# Patient Record
Sex: FEMALE | Race: WHITE | Employment: PART TIME | URBAN - METROPOLITAN AREA
[De-identification: names, ages, dates, MRNs, and addresses within clinical notes are randomized per-mention and may not be internally consistent; named-entity substitution may affect disease eponyms.]

---

## 2019-10-02 ENCOUNTER — HOSPITAL ENCOUNTER (OUTPATIENT)
Dept: PREADMISSION TESTING | Age: 59
Discharge: HOME | End: 2019-10-02
Payer: MEDICARE

## 2019-10-02 VITALS — WEIGHT: 170 LBS | BODY MASS INDEX: 38.24 KG/M2 | HEIGHT: 56 IN

## 2019-10-02 LAB
ANION GAP SERPL CALC-SCNC: 6 MMOL/L (ref 3–18)
BUN SERPL-MCNC: 13 MG/DL (ref 7–18)
BUN/CREAT SERPL: 23 (ref 12–20)
CALCIUM SERPL-MCNC: 9.3 MG/DL (ref 8.5–10.1)
CHLORIDE SERPL-SCNC: 105 MMOL/L (ref 100–111)
CO2 SERPL-SCNC: 30 MMOL/L (ref 21–32)
CREAT SERPL-MCNC: 0.56 MG/DL (ref 0.6–1.3)
GLUCOSE SERPL-MCNC: 81 MG/DL (ref 74–99)
HCT VFR BLD AUTO: 36.8 % (ref 35–45)
HGB BLD-MCNC: 12 G/DL (ref 12–16)
POTASSIUM SERPL-SCNC: 3.8 MMOL/L (ref 3.5–5.5)
SODIUM SERPL-SCNC: 141 MMOL/L (ref 136–145)

## 2019-10-02 PROCEDURE — 80048 BASIC METABOLIC PNL TOTAL CA: CPT

## 2019-10-02 PROCEDURE — 36415 COLL VENOUS BLD VENIPUNCTURE: CPT

## 2019-10-02 PROCEDURE — 85018 HEMOGLOBIN: CPT

## 2019-10-02 PROCEDURE — 93005 ELECTROCARDIOGRAM TRACING: CPT

## 2019-10-02 RX ORDER — SODIUM CHLORIDE, SODIUM LACTATE, POTASSIUM CHLORIDE, CALCIUM CHLORIDE 600; 310; 30; 20 MG/100ML; MG/100ML; MG/100ML; MG/100ML
125 INJECTION, SOLUTION INTRAVENOUS CONTINUOUS
Status: CANCELLED | OUTPATIENT
Start: 2019-10-02

## 2019-10-02 RX ORDER — CEFAZOLIN SODIUM/WATER 2 G/20 ML
2 SYRINGE (ML) INTRAVENOUS ONCE
Status: CANCELLED | OUTPATIENT
Start: 2019-10-02 | End: 2019-10-02

## 2019-10-02 RX ORDER — MELATONIN 10 MG
1 CAPSULE ORAL
COMMUNITY

## 2019-10-02 RX ORDER — CITALOPRAM 40 MG/1
40 TABLET, FILM COATED ORAL EVERY EVENING
COMMUNITY

## 2019-10-02 RX ORDER — ASCORBIC ACID 500 MG
1000 TABLET ORAL DAILY
COMMUNITY

## 2019-10-02 NOTE — PERIOP NOTES
Patient does meet criteria for special pop. No hx of sleep apnea or previous screening. Denies hx of MH. PCP is aware of surgery. CHG skin care kit given and process reviewed. Not participating in any research study or clinical trials. Patient has Cerebral Palsy and is confined to a wheelchair.

## 2019-10-03 LAB
ATRIAL RATE: 63 BPM
CALCULATED P AXIS, ECG09: 34 DEGREES
CALCULATED R AXIS, ECG10: 10 DEGREES
CALCULATED T AXIS, ECG11: 37 DEGREES
DIAGNOSIS, 93000: NORMAL
P-R INTERVAL, ECG05: 154 MS
Q-T INTERVAL, ECG07: 416 MS
QRS DURATION, ECG06: 84 MS
QTC CALCULATION (BEZET), ECG08: 425 MS
VENTRICULAR RATE, ECG03: 63 BPM

## 2019-10-08 ENCOUNTER — ANESTHESIA EVENT (OUTPATIENT)
Dept: SURGERY | Age: 59
End: 2019-10-08
Payer: MEDICARE

## 2019-10-08 ENCOUNTER — ANESTHESIA (OUTPATIENT)
Dept: SURGERY | Age: 59
End: 2019-10-08
Payer: MEDICARE

## 2019-10-08 ENCOUNTER — HOSPITAL ENCOUNTER (OUTPATIENT)
Age: 59
Setting detail: OUTPATIENT SURGERY
Discharge: HOME | End: 2019-10-08
Attending: UROLOGY | Admitting: UROLOGY
Payer: MEDICARE

## 2019-10-08 ENCOUNTER — APPOINTMENT (OUTPATIENT)
Dept: GENERAL RADIOLOGY | Age: 59
End: 2019-10-08
Attending: UROLOGY
Payer: MEDICARE

## 2019-10-08 VITALS
RESPIRATION RATE: 20 BRPM | DIASTOLIC BLOOD PRESSURE: 71 MMHG | TEMPERATURE: 97.8 F | BODY MASS INDEX: 38.24 KG/M2 | OXYGEN SATURATION: 100 % | WEIGHT: 170 LBS | HEIGHT: 56 IN | HEART RATE: 65 BPM | SYSTOLIC BLOOD PRESSURE: 125 MMHG

## 2019-10-08 PROCEDURE — C1769 GUIDE WIRE: HCPCS | Performed by: UROLOGY

## 2019-10-08 PROCEDURE — 74011000250 HC RX REV CODE- 250

## 2019-10-08 PROCEDURE — 76210000021 HC REC RM PH II 0.5 TO 1 HR: Performed by: UROLOGY

## 2019-10-08 PROCEDURE — 77030020782 HC GWN BAIR PAWS FLX 3M -B: Performed by: UROLOGY

## 2019-10-08 PROCEDURE — 74011250636 HC RX REV CODE- 250/636

## 2019-10-08 PROCEDURE — 74011250636 HC RX REV CODE- 250/636: Performed by: UROLOGY

## 2019-10-08 PROCEDURE — 76010000154 HC OR TIME FIRST 0.5 HR: Performed by: UROLOGY

## 2019-10-08 PROCEDURE — 76060000031 HC ANESTHESIA FIRST 0.5 HR: Performed by: UROLOGY

## 2019-10-08 RX ORDER — OXYCODONE AND ACETAMINOPHEN 5; 325 MG/1; MG/1
1 TABLET ORAL AS NEEDED
Status: CANCELLED | OUTPATIENT
Start: 2019-10-08

## 2019-10-08 RX ORDER — PROPOFOL 10 MG/ML
INJECTION, EMULSION INTRAVENOUS AS NEEDED
Status: DISCONTINUED | OUTPATIENT
Start: 2019-10-08 | End: 2019-10-08 | Stop reason: HOSPADM

## 2019-10-08 RX ORDER — SODIUM CHLORIDE, SODIUM LACTATE, POTASSIUM CHLORIDE, CALCIUM CHLORIDE 600; 310; 30; 20 MG/100ML; MG/100ML; MG/100ML; MG/100ML
50 INJECTION, SOLUTION INTRAVENOUS CONTINUOUS
Status: CANCELLED | OUTPATIENT
Start: 2019-10-08

## 2019-10-08 RX ORDER — KETAMINE HYDROCHLORIDE 10 MG/ML
INJECTION, SOLUTION INTRAMUSCULAR; INTRAVENOUS AS NEEDED
Status: DISCONTINUED | OUTPATIENT
Start: 2019-10-08 | End: 2019-10-08 | Stop reason: HOSPADM

## 2019-10-08 RX ORDER — MIDAZOLAM HYDROCHLORIDE 1 MG/ML
INJECTION, SOLUTION INTRAMUSCULAR; INTRAVENOUS AS NEEDED
Status: DISCONTINUED | OUTPATIENT
Start: 2019-10-08 | End: 2019-10-08 | Stop reason: HOSPADM

## 2019-10-08 RX ORDER — FENTANYL CITRATE 50 UG/ML
25 INJECTION, SOLUTION INTRAMUSCULAR; INTRAVENOUS
Status: CANCELLED | OUTPATIENT
Start: 2019-10-08

## 2019-10-08 RX ORDER — CEPHALEXIN 500 MG/1
500 CAPSULE ORAL 3 TIMES DAILY
Qty: 9 CAP | Refills: 0 | Status: SHIPPED | OUTPATIENT
Start: 2019-10-08 | End: 2019-10-11

## 2019-10-08 RX ORDER — NALOXONE HYDROCHLORIDE 0.4 MG/ML
0.1 INJECTION, SOLUTION INTRAMUSCULAR; INTRAVENOUS; SUBCUTANEOUS
Status: CANCELLED | OUTPATIENT
Start: 2019-10-08

## 2019-10-08 RX ORDER — LIDOCAINE HYDROCHLORIDE 20 MG/ML
INJECTION, SOLUTION EPIDURAL; INFILTRATION; INTRACAUDAL; PERINEURAL AS NEEDED
Status: DISCONTINUED | OUTPATIENT
Start: 2019-10-08 | End: 2019-10-08 | Stop reason: HOSPADM

## 2019-10-08 RX ORDER — HYDROMORPHONE HYDROCHLORIDE 2 MG/ML
0.5 INJECTION, SOLUTION INTRAMUSCULAR; INTRAVENOUS; SUBCUTANEOUS
Status: CANCELLED | OUTPATIENT
Start: 2019-10-08

## 2019-10-08 RX ORDER — ONDANSETRON 2 MG/ML
4 INJECTION INTRAMUSCULAR; INTRAVENOUS ONCE
Status: CANCELLED | OUTPATIENT
Start: 2019-10-08 | End: 2019-10-08

## 2019-10-08 RX ORDER — CEFAZOLIN SODIUM/WATER 2 G/20 ML
2 SYRINGE (ML) INTRAVENOUS ONCE
Status: COMPLETED | OUTPATIENT
Start: 2019-10-08 | End: 2019-10-08

## 2019-10-08 RX ORDER — MAGNESIUM SULFATE 100 %
4 CRYSTALS MISCELLANEOUS AS NEEDED
Status: CANCELLED | OUTPATIENT
Start: 2019-10-08

## 2019-10-08 RX ORDER — INSULIN LISPRO 100 [IU]/ML
INJECTION, SOLUTION INTRAVENOUS; SUBCUTANEOUS ONCE
Status: CANCELLED | OUTPATIENT
Start: 2019-10-08 | End: 2019-10-09

## 2019-10-08 RX ORDER — SODIUM CHLORIDE, SODIUM LACTATE, POTASSIUM CHLORIDE, CALCIUM CHLORIDE 600; 310; 30; 20 MG/100ML; MG/100ML; MG/100ML; MG/100ML
125 INJECTION, SOLUTION INTRAVENOUS CONTINUOUS
Status: DISCONTINUED | OUTPATIENT
Start: 2019-10-08 | End: 2019-10-08 | Stop reason: HOSPADM

## 2019-10-08 RX ADMIN — MIDAZOLAM HYDROCHLORIDE 2 MG: 1 INJECTION, SOLUTION INTRAMUSCULAR; INTRAVENOUS at 13:35

## 2019-10-08 RX ADMIN — PROPOFOL 30 MG: 10 INJECTION, EMULSION INTRAVENOUS at 13:57

## 2019-10-08 RX ADMIN — SODIUM CHLORIDE, SODIUM LACTATE, POTASSIUM CHLORIDE, AND CALCIUM CHLORIDE 125 ML/HR: 600; 310; 30; 20 INJECTION, SOLUTION INTRAVENOUS at 12:30

## 2019-10-08 RX ADMIN — Medication 2 G: at 13:35

## 2019-10-08 RX ADMIN — PROPOFOL 60 MG: 10 INJECTION, EMULSION INTRAVENOUS at 13:52

## 2019-10-08 RX ADMIN — KETAMINE HYDROCHLORIDE 10 MG: 10 INJECTION, SOLUTION INTRAMUSCULAR; INTRAVENOUS at 13:49

## 2019-10-08 RX ADMIN — KETAMINE HYDROCHLORIDE 20 MG: 10 INJECTION, SOLUTION INTRAMUSCULAR; INTRAVENOUS at 13:44

## 2019-10-08 RX ADMIN — LIDOCAINE HYDROCHLORIDE 60 MG: 20 INJECTION, SOLUTION EPIDURAL; INFILTRATION; INTRACAUDAL; PERINEURAL at 13:52

## 2019-10-08 NOTE — H&P
Urology [facility]  [address] [place]  [address]  Tel: [phone]  Fax: [phone]        Patient: Carlen Bumpers  YOB: 1960          Completed Orders (this encounter)  Order Interpretation Result Next Lab Date   URINALYSIS, AUTO, W/O SCOPE see detail Test 1Color: yellow. Clarity: clear. Glucose: negative. Bilirubin: negative. Ketones: negative. Specific Gravity: 1.015. Blood: negative. pH: 7.0. Protein: negative. Urobilinogen: 0.2 mg/dl  Nitrite: negative. Leukocytes: negative. Assessment/Plan  # Detail Type Description    1. Assessment Other urethral stricture, female (N35.82). Patient Plan She has worsening sx's and difficulties voiding. She has a worse PVR. We will do a cysto and urethral dilation. We discussed that likely we will not need to leave a cath as she has not needed them before but there's the possibility we would. Risk of bleeding and infection were also discussed. She will proceed. 2. Assessment Feeling of incomplete bladder emptying (R39.14). Plan Orders The patient had the following test(s) completed today: URINALYSIS, AUTO, W/O SCOPE. This 64year old female presents for Urinary Retention. History of Present Illness:  1. Urinary Retention   Onset was 23 years ago. Severity level is no pain. It occurs daily. The problem is worse. Associated symptoms include incomplete emptying, nocturia, pelvic pressure, slow stream, urgency, dribbling and urinary frequency. Pertinent negatives include chills, constipation and fever. Additional information: BM's QOD -- that are huge and hard like a cucumber. she leaks with what is probably stress manuevers  -- She has been dilated every 2-4 years in the operating room in the past when her symptoms get worse and then they get better post-op. She has a h/o CP. She is s/p post cysto/dilation 9/2014.   she feels much better    Pretreatment PVR = 245 ml and 0 ml psot-op and 44ml [id number]. .       9/2019 --She is doing worse in the past few months and really straining to urinate. Her flow is poor and she strains and pulls /strains abd muscles while trying to void. No UTI. No hematuria. She is struggling. PVR = 375ml. PAST MEDICAL/SURGICAL HISTORY   (Detailed)    Disease/disorder Onset Date Management Date Comments   Hardware removal right ankle/foot 2013      Right ankle surgery 2013        cysto, urethral dilation 09/30/2014      cysto, urehtral dilation 03/14/2012      Baclofen Pump 2012      Cholecystectomy 1993    congenital hip dysplasia  Left Hip Surgery 1976      Scissor Brielle 1966      Heal Cords 1966      OBSTETRIC HISTORY:  Not currently pregnant. PROBLEM LIST:   Problem List reviewed.    Problem Description Onset Date Chronic Clinical Status Notes   Infantile cerebral palsy 03/05/2012 Y     Benign essential hypertension 08/12/2013 Y     Gastroesophageal reflux disease 08/12/2013 Y     Depressive disorder 08/12/2013 Y     Acquired deformity of ankle AND/OR foot 08/17/2013 Y     Generalized anxiety disorder 10/25/2013 Y           Medications (active prior to today)  Medication Instructions Start Date Stop Date Refilled Elsewhere   Vitamin D3 1,000 unit tablet Twice a day 08/06/2013   N   Vitamin B-12 500 mcg tablet 1 daily 08/06/2013   N   baclofen 10 mg tablet  //   Y   Vitamin C 1,000 mg tablet  //   Y   Celexa 40 mg tablet take 1 tablet by oral route  every day 12/12/2018 12/12/2018 N   PANTOPRAZOLE TAB 40MG DR TAKE 1 TABLET DAILY 01/10/2019  01/10/2019 N   ramipril 5 mg capsule take 1 capsule by oral route 2 times every day 03/13/2019   N   RAMIPRIL 10 MG CAPSULE TAKE ONE CAPSULE BY MOUTH EVERY DAY 07/15/2019  07/15/2019 N   amitriptyline 10 mg tablet take 1-2  tablet (10-20 MG)  by oral route  every day 08/15/2019  08/15/2019 N   Lasix 20 mg tablet TAKE 1 TABLET BY MOUTH ONE TO 2 TIMES EVERY DAY FOR FLUID RETENTION 09/11/2019 09/11/2019 N Medication Reconciliation  Medications reconciled today. Allergies  Ingredient Reaction (Severity) Medication Name Comment   CODEINE Sleep Walk     TERBINAFINE HCL diarrhea  Lamisil     Reviewed, no changes. Family History  (Detailed)  Relationship Family Member Name  Age at Death Condition Onset Age Cause of Death       Family history of GERD  N       Family history of Asthma  N   Brother  N  Alive and well  N   Father  N  Alive and well  N   Mother  N  Depression  N         Social History:  (Detailed)  Tobacco use reviewed. Preferred language is [place]. EDUCATION/EMPLOYMENT/OCCUPATION  The patient has a(n) high school education. Patient has special needs: CP.   MARITAL STATUS/FAMILY/SOCIAL SUPPORT  Currently single. CHILDREN  Does not have children. [address] [place] [address] status is stable/permanent. The patient lives with mother and father. Her mother is her best frined. She reports she has a lot of friends but their cars can't accomodate her wheelchair  Tobacco use status: Never smoked tobacco.  Smoking status: Never smoker. SMOKING STATUS  Use Status Type Smoking Status Usage Per Day Years Used Total Pack Years   no/never  Never smoker        ALCOHOL  There is no history of alcohol use. CAFFEINE  The patient uses caffeine: tea.  EXPERIENCE  Patient has no  experience. Review of Systems  System Neg/Pos Details   Constitutional Negative Chills and Fever. ENMT Negative Ear infections and Sore throat. Eyes Negative Blurred vision, Double vision and Eye pain. Respiratory Negative Asthma, Chronic cough, Dyspnea and Wheezing. Cardio Negative Chest pain. GI Negative Constipation, Decreased appetite, Diarrhea, Nausea and Vomiting.  Positive Incomplete emptying, Nocturia, Pelvic pressure, Slow stream, Urgency, Urinary dribbling, Urinary frequency.    Endocrine Negative Cold intolerance, Heat intolerance, Increased thirst and Weight loss.   Neuro Negative Headache and Tremors. Psych Negative Anxiety and Depression. Integumentary Negative Itching skin and Rash. MS Negative Back pain and Joint pain. Hema/Lymph Negative Easy bleeding. Vital Signs     Height  Time ft in cm Last Measured Height Position   3:05 PM 4.0 8.00 142.24 09/16/2019 Standing     Weight/BSA/BMI  Time lb oz kg Context BMI kg/m2 BSA m2   3:05 .00  77.111 dressed with shoes 38.11      Blood Pressure  Time BP mm/Hg Position Side Site Method Cuff Size   3:05 /69 sitting right  automatic adult     Temperature/Pulse/Respiration  Time Temp F Temp C Temp Site Pulse/min Pattern Resp/ min   3:05 PM 98.10 36.72  86       Measured By  Time Measured by   3:05 PM Brady Phelps       Physical Exam  Exam Findings Details   Constitutional * Overall appearance - wheelchair bound. Neck Exam Normal Inspection - Normal.   Respiratory Normal Inspection - Normal.   Abdomen Normal No abdominal tenderness. Genitourinary Normal No Suprapubic tenderness. No CVA tenderness. Extremity Normal No Edema. Psychiatric Normal Orientation - Oriented to time, place, person & situation. Appropriate mood and affect. Procedures:    Uroflow:  Feeling of incomplete bladder emptying R39.14. Procedure:   Sonographic residual urine: 375mL. Time after void: 5 Minutes. Comments:. Physician: Yareli Cherry MD. Date: 09/16/2019. Time: 3:14 PM.  Post procedure: Instructions:. Patient Education  # Patient Education   1.  Urinary Retention: Care Instructions     Medications (added, continued, or stopped today)  Start Date Medication Directions PRN Status PRN Reason Instruction Stop Date   08/15/2019 amitriptyline 10 mg tablet take 1-2  tablet (10-20 MG)  by oral route  every day N       baclofen 10 mg tablet  N      12/12/2018 Celexa 40 mg tablet take 1 tablet by oral route  every day N      09/11/2019 Lasix 20 mg tablet TAKE 1 TABLET BY MOUTH ONE TO 2 TIMES EVERY DAY FOR FLUID RETENTION N 01/10/2019 PANTOPRAZOLE TAB 40MG DR TAKE 1 TABLET DAILY N      07/15/2019 RAMIPRIL 10 MG CAPSULE TAKE ONE CAPSULE BY MOUTH EVERY DAY N      03/13/2019 ramipril 5 mg capsule take 1 capsule by oral route 2 times every day N      08/06/2013 Vitamin B-12 500 mcg tablet 1 daily N       Vitamin C 1,000 mg tablet  N      08/06/2013 Vitamin D3 1,000 unit tablet Twice a day N      Active Patient Care Team Members    Name Contact Agency Type Support Role Relationship Active Date Inactive Date Specialty   Wagner Lim   specialist    Psychiatry   [facility]   specialist    Ortho   [employer]   Patient provider PCP   Family Practice   [employer]   primary care provider       Yossi Garnica   encounter provider    Urology   Lynnea Felty   encounter provider    Physical Therapy   Denisse Vega   primary practice provider       Richard Rico   encounter provider      Odilon Alston   specialist    Psychiatry   Halina Po    Parent, Child is the Patient      Halina Po   Emergency Contact Father          Provider:       Jenni Solano MD

## 2019-10-08 NOTE — BRIEF OP NOTE
BRIEF OPERATIVE NOTE    Date of Procedure: 10/8/2019   Preoperative Diagnosis: FEELING INCOMPLETE BLADDER EMPTYING, FEMALE URETHRAL STRICTURE  Postoperative Diagnosis: FEELING INCOMPLETE BLADDER EMPTYING,  FEMALE URETHRAL STRICTURE    Procedure(s):  CYSTOSCOPY, URETHRAL DILATATION W/C-ARM, \"SPEC POP\"  Surgeon(s) and Role:     * Cindy Pablo MD - Primary         Surgical Assistant: NONE    Surgical Staff:  Circ-1: Lang Wynne  Circ-2: Spring Luna RN  Radiology Technician: Светлана Montemayor  Scrub Tech-1: Caryl Aceves  Scrub Tech-2: Missy Bazan  Event Time In Time Out   Incision Start 1356    Incision Close 1400      Anesthesia: MAC   Estimated Blood Loss: NONE  Specimens: * No specimens in log *   Findings: MODERATELY TIGHT URETHRA NONE   Complications: NONE  Implants: * No implants in log *

## 2019-10-08 NOTE — INTERVAL H&P NOTE
H&P Update: 
Linnette Fitch was seen and examined. History and physical has been reviewed. The patient has been examined.  There have been no significant clinical changes since the completion of the originally dated History and Physical.

## 2019-10-08 NOTE — PERIOP NOTES
Reviewed PTA medication list with patient/caregiver and patient/caregiver denies any additional medications. Patient admits to having a responsible adult care for them at home for at least 24 hours after surgery. Patient encouraged to use gown warming system and informed that using said warming gown to regulate body temperature prior to a procedure has been shown to help reduce the risks of blood clots and infection. Dual skin assessment & fall risk band verification completed with Zeina Burton RN.

## 2019-10-08 NOTE — PERIOP NOTES
Discharge instructions completed - opportunity for questions - AVS med list reviewed for duplicates - states \" still urinating on bedpan\" -

## 2019-10-08 NOTE — ANESTHESIA POSTPROCEDURE EVALUATION
Post-Anesthesia Evaluation and Assessment    Cardiovascular Function/Vital Signs  Visit Vitals  /71   Pulse 65   Temp 36.6 °C (97.8 °F)   Resp 20   Ht 4' 8\" (1.422 m)   Wt 77.1 kg (170 lb)   SpO2 100%   BMI 38.11 kg/m²       Patient is status post Procedure(s):  CYSTOSCOPY, URETHRAL DILATATION W/C-ARM, \"SPEC POP\". Nausea/Vomiting: Controlled. Postoperative hydration reviewed and adequate. Pain:  Pain Scale 1: Numeric (0 - 10) (10/08/19 1510)  Pain Intensity 1: 0 (10/08/19 1510)   Managed. Neurological Status:   Neuro (WDL): Exceptions to WDL(cerebral palsy) (10/08/19 1510)   At baseline. Mental Status and Level of Consciousness: Baseline and stable. Pulmonary Status:   O2 Device: Room air (10/08/19 1510)   Adequate oxygenation and airway patent. Complications related to anesthesia: None    Post-anesthesia assessment completed. No concerns. Patient has met all discharge requirements.     Signed By: Lonny Mukherjee MD

## 2019-10-08 NOTE — ANESTHESIA PREPROCEDURE EVALUATION
Relevant Problems   No relevant active problems       Anesthetic History     PONV          Review of Systems / Medical History  Patient summary reviewed, nursing notes reviewed and pertinent labs reviewed    Pulmonary  Within defined limits                 Neuro/Psych         Psychiatric history     Cardiovascular    Hypertension                   GI/Hepatic/Renal     GERD: well controlled           Endo/Other        Morbid obesity and arthritis     Other Findings              Physical Exam    Airway  Mallampati: II  TM Distance: 4 - 6 cm  Neck ROM: normal range of motion   Mouth opening: Normal     Cardiovascular  Regular rate and rhythm,  S1 and S2 normal,  no murmur, click, rub, or gallop             Dental  No notable dental hx       Pulmonary  Breath sounds clear to auscultation               Abdominal  GI exam deferred       Other Findings            Anesthetic Plan    ASA: 3  Anesthesia type: MAC          Induction: Intravenous  Anesthetic plan and risks discussed with: Patient

## 2019-10-08 NOTE — DISCHARGE INSTRUCTIONS
Drink plenty of fluids to keep the urine clear  Resume pre hospital diet  Activity as tolerated  Take prescription as directed  Dr. Jessica Garcia office will call with follow up appointment      Patient Education        Cystoscopy: What to Expect at [facility]    A cystoscopy is a procedure that lets a doctor look inside of the bladder and the urethra. The urethra is the tube that carries urine from the bladder to outside the body. The doctor uses a thin, lighted tool called a cystoscope. Your bladder is filled with fluid. This stretches the bladder so that your doctor can look closely at the inside of your bladder. After the cystoscopy, your urethra may be sore at first, and it may burn when you urinate for the first few days after the procedure. You may feel the need to urinate more often, and your urine may be pink. These symptoms should get better in 1 or 2 days. You will probably be able to go back to most of your usual activities in 1 or 2 days. This care sheet gives you a general idea about how long it will take for you to recover. But each person recovers at a different pace. Follow the steps below to get better as quickly as possible. How can you care for yourself at home? Activity    · Rest when you feel tired. Getting enough sleep will help you recover.     · Try to walk each day. Start by walking a little more than you did the day before. Bit by bit, increase the amount you walk. Walking boosts blood flow and helps prevent pneumonia and constipation.     · Avoid strenuous activities, such as bicycle riding, jogging, weight lifting, or aerobic exercise, until your doctor says it is okay.     · Ask your doctor when you can drive again.     · Most people are able to return to work within 1 or 2 days after the procedure.     · You may shower and take baths as usual.     · Ask your doctor when it is okay for you to have sex. Diet    · You can eat your normal diet.  If your stomach is upset, try bland, low-fat foods like plain rice, broiled chicken, toast, and yogurt.     · Drink plenty of fluids (unless your doctor tells you not to). Medicines    · Take pain medicines exactly as directed. ? If the doctor gave you a prescription medicine for pain, take it as prescribed. ? If you are not taking a prescription pain medicine, ask your doctor if you can take an over-the-counter medicine.     · If you think your pain medicine is making you sick to your stomach:  ? Take your medicine after meals (unless your doctor has told you not to). ? Ask your doctor for a different pain medicine.     · If your doctor prescribed antibiotics, take them as directed. Do not stop taking them just because you feel better. You need to take the full course of antibiotics. Follow-up care is a key part of your treatment and safety. Be sure to make and go to all appointments, and call your doctor if you are having problems. It's also a good idea to know your test results and keep a list of the medicines you take. When should you call for help? Call 911 anytime you think you may need emergency care. For example, call if:    · You passed out (lost consciousness).     · You have severe trouble breathing.     · You have sudden chest pain and shortness of breath, or you cough up blood.     · You have severe belly pain.    Call your doctor now or seek immediate medical care if:    · You are sick to your stomach or cannot keep fluids down.     · Your urine is still red or you see blood clots after you have urinated several times.     · You have trouble passing urine or stool, especially if you have pain or swelling in your lower belly.     · You have signs of a blood clot, such as:  ? Pain in your calf, back of the knee, thigh, or groin. ? Redness and swelling in your leg or groin.     · You develop a fever or severe chills.     · You have pain in your back just below your rib cage.  This is called flank pain.    Watch closely for changes in your health, and be sure to contact your doctor if:    · You have pain or burning when you urinate. A burning feeling is normal for a day or two after the test, but call if it does not get better.     · You have a frequent urge to urinate but can pass only small amounts of urine.     · Your urine is pink, red, or cloudy, or smells bad. It is normal for the urine to have a pinkish color for a few days after the test, but call if it does not get better. Where can you learn more? Go to http://elizabeth-mamadou.info/. Enter F875 in the search box to learn more about \"Cystoscopy: What to Expect at Home. \"  Current as of: December 19, 2018  Content Version: 12.2  © 5194-6344 [employer]. Care instructions adapted under license by [employer] (which disclaims liability or warranty for this information). If you have questions about a medical condition or this instruction, always ask your healthcare professional. [place] [address] any warranty or liability for your use of this information. DISCHARGE SUMMARY from Nurse    PATIENT INSTRUCTIONS:    After general anesthesia or intravenous sedation, for 24 hours or while taking prescription Narcotics:  · Limit your activities  · Do not drive and operate hazardous machinery  · Do not make important personal or business decisions  · Do  not drink alcoholic beverages  · If you have not urinated within 8 hours after discharge, please contact your surgeon on call.     Report the following to your surgeon:  · Excessive pain, swelling, redness or odor of or around the surgical area  · Temperature over 100.5  · Nausea and vomiting lasting longer than 4 hours or if unable to take medications  · Any signs of decreased circulation or nerve impairment to extremity: change in color, persistent  numbness, tingling, coldness or increase pain  · Any questions    What to do at Home:  Recommended activity: Activity as tolerated,     If you experience any of the following symptoms fever, chills, uncontrollable pain , active bleeding ( thick, bloody urine with clots ) , nausea , vomiting, please follow up with Dr. Suze Orourke. *  Please give a list of your current medications to your Primary Care Provider. *  Please update this list whenever your medications are discontinued, doses are      changed, or new medications (including over-the-counter products) are added. *  Please carry medication information at all times in case of emergency situations. These are general instructions for a healthy lifestyle:    No smoking/ No tobacco products/ Avoid exposure to second hand smoke  Surgeon General's Warning:  Quitting smoking now greatly reduces serious risk to your health. Obesity, smoking, and sedentary lifestyle greatly increases your risk for illness    A healthy diet, regular physical exercise & weight monitoring are important for maintaining a healthy lifestyle    You may be retaining fluid if you have a history of heart failure or if you experience any of the following symptoms:  Weight gain of 3 pounds or more overnight or 5 pounds in a week, increased swelling in our hands or feet or shortness of breath while lying flat in bed. Please call your doctor as soon as you notice any of these symptoms; do not wait until your next office visit. Patient armband removed and shredded    The discharge information has been reviewed with the patient and caregiver. The patient and caregiver verbalized understanding. Discharge medications reviewed with the patient and caregiver and appropriate educational materials and side effects teaching were provided.   ___________________________________________________________________________________________________________________________________

## 2019-10-09 NOTE — OP NOTES
[facility]    Name:  Sharif Calabrese  MR#:   [id number]  :  1960  ACCOUNT #:  [de-identified]  DATE OF SERVICE:  10/08/2019    PREOPERATIVE DIAGNOSIS:  Female urethral stricture. POSTOPERATIVE DIAGNOSIS:  Female urethral stricture. PROCEDURE PERFORMED:  Cystoscopy, urethral dilation. SURGEON:  Talib Robledo MD    ASSISTANT:  None. ANESTHESIA:  MAC.    COMPLICATIONS:  None. SPECIMENS REMOVED:  None. IMPLANTS:  None. ESTIMATED BLOOD LOSS:  None. FINDINGS:  The patient had a moderately tight urethra. DISPOSITION:  The patient was taken to recovery in stable condition. CLINICAL NOTE:  The patient is a 53-year-old female who had a urethral dilation for very tight urethra, last dilated in . She has had worsening of her stream and retaining more urine. She presents for repeat dilation. PROCEDURE:  Preoperatively, risks and benefits of surgery were described to the patient where the risks include but are not limited to bleeding, infection, injury to the bladder, injury to the urethra, and possible need for future procedures. The patient understood the risks and signed the informed consent. The patient was taken to the operating room and placed on the OR table in supine position. She was administered a MAC anesthetic. She was placed in dorsal lithotomy position, prepped and draped in the usual sterile manner. I attempted to place a 21-Turkish cystoscope, but the urethra was somewhat tight. Thus, I passed a sensor wire through the scope into the bladder confirmed by fluoroscopy. I then sequentially dilated the urethra from 12-Turkish up to 30-Turkish. This was easily done. I then removed the wire, and I easily passed a 21-Turkish cystoscopic sheath transurethrally atraumatically under direct vision using a 30-degree lens. There were grade II bladder trabeculations. There were no stones, no tumors, no areas of concern within the bladder.   At this point, I emptied the bladder, and the patient was taken out of lithotomy position after the scope was removed. The patient was then taken to recovery in stable condition.       MD SHIREEN Ervin/[email or web address]  D:  10/08/2019 14:29  T:  10/09/2019 1:19  JOB #:  [id number]

## 2021-01-07 ENCOUNTER — HOSPITAL ENCOUNTER (OUTPATIENT)
Dept: PREADMISSION TESTING | Age: 61
Discharge: HOME | End: 2021-01-07
Payer: MEDICARE

## 2021-01-07 PROCEDURE — 87635 SARS-COV-2 COVID-19 AMP PRB: CPT

## 2021-01-08 LAB — SARS-COV-2, COV2NT: NOT DETECTED

## 2021-01-08 RX ORDER — EPINEPHRINE 0.1 MG/ML
1 INJECTION INTRACARDIAC; INTRAVENOUS
Status: CANCELLED | OUTPATIENT
Start: 2021-01-08 | End: 2021-01-09

## 2021-01-08 RX ORDER — DIPHENHYDRAMINE HYDROCHLORIDE 50 MG/ML
50 INJECTION, SOLUTION INTRAMUSCULAR; INTRAVENOUS ONCE
Status: CANCELLED | OUTPATIENT
Start: 2021-01-08 | End: 2021-01-08

## 2021-01-08 RX ORDER — NALOXONE HYDROCHLORIDE 0.4 MG/ML
0.4 INJECTION, SOLUTION INTRAMUSCULAR; INTRAVENOUS; SUBCUTANEOUS
Status: CANCELLED | OUTPATIENT
Start: 2021-01-08 | End: 2021-01-08

## 2021-01-08 RX ORDER — SODIUM CHLORIDE 0.9 % (FLUSH) 0.9 %
5-40 SYRINGE (ML) INJECTION AS NEEDED
Status: CANCELLED | OUTPATIENT
Start: 2021-01-08

## 2021-01-08 RX ORDER — SODIUM CHLORIDE 0.9 % (FLUSH) 0.9 %
5-40 SYRINGE (ML) INJECTION EVERY 8 HOURS
Status: CANCELLED | OUTPATIENT
Start: 2021-01-08

## 2021-01-08 RX ORDER — FLUMAZENIL 0.1 MG/ML
0.2 INJECTION INTRAVENOUS
Status: CANCELLED | OUTPATIENT
Start: 2021-01-08 | End: 2021-01-08

## 2021-01-08 RX ORDER — ATROPINE SULFATE 0.1 MG/ML
0.5 INJECTION INTRAVENOUS
Status: CANCELLED | OUTPATIENT
Start: 2021-01-08 | End: 2021-01-09

## 2021-01-11 ENCOUNTER — HOSPITAL ENCOUNTER (OUTPATIENT)
Age: 61
Setting detail: OUTPATIENT SURGERY
Discharge: HOME | End: 2021-01-11
Attending: INTERNAL MEDICINE | Admitting: INTERNAL MEDICINE
Payer: MEDICARE

## 2021-01-11 VITALS
HEART RATE: 77 BPM | RESPIRATION RATE: 16 BRPM | OXYGEN SATURATION: 100 % | HEIGHT: 56 IN | BODY MASS INDEX: 34.87 KG/M2 | TEMPERATURE: 98.9 F | SYSTOLIC BLOOD PRESSURE: 142 MMHG | WEIGHT: 155 LBS | DIASTOLIC BLOOD PRESSURE: 72 MMHG

## 2021-01-11 PROCEDURE — G0500 MOD SEDAT ENDO SERVICE >5YRS: HCPCS | Performed by: INTERNAL MEDICINE

## 2021-01-11 PROCEDURE — 77030013992 HC SNR POLYP ENDOSC BSC -B: Performed by: INTERNAL MEDICINE

## 2021-01-11 PROCEDURE — 74011250636 HC RX REV CODE- 250/636: Performed by: INTERNAL MEDICINE

## 2021-01-11 PROCEDURE — 77030040361 HC SLV COMPR DVT MDII -B: Performed by: INTERNAL MEDICINE

## 2021-01-11 PROCEDURE — 99153 MOD SED SAME PHYS/QHP EA: CPT | Performed by: INTERNAL MEDICINE

## 2021-01-11 PROCEDURE — 88305 TISSUE EXAM BY PATHOLOGIST: CPT

## 2021-01-11 PROCEDURE — 77030039961 HC KT CUST COLON BSC -D: Performed by: INTERNAL MEDICINE

## 2021-01-11 PROCEDURE — 76040000007: Performed by: INTERNAL MEDICINE

## 2021-01-11 PROCEDURE — 2709999900 HC NON-CHARGEABLE SUPPLY: Performed by: INTERNAL MEDICINE

## 2021-01-11 RX ORDER — MIDAZOLAM HYDROCHLORIDE 1 MG/ML
.25-5 INJECTION, SOLUTION INTRAMUSCULAR; INTRAVENOUS
Status: DISCONTINUED | OUTPATIENT
Start: 2021-01-11 | End: 2021-01-11 | Stop reason: HOSPADM

## 2021-01-11 RX ORDER — SODIUM CHLORIDE 9 MG/ML
1000 INJECTION, SOLUTION INTRAVENOUS CONTINUOUS
Status: DISCONTINUED | OUTPATIENT
Start: 2021-01-11 | End: 2021-01-11 | Stop reason: HOSPADM

## 2021-01-11 RX ORDER — DEXTROMETHORPHAN/PSEUDOEPHED 2.5-7.5/.8
1.2 DROPS ORAL
Status: DISCONTINUED | OUTPATIENT
Start: 2021-01-11 | End: 2021-01-11 | Stop reason: HOSPADM

## 2021-01-11 RX ORDER — CHOLECALCIFEROL (VITAMIN D3) 50 MCG
CAPSULE ORAL DAILY
COMMUNITY

## 2021-01-11 RX ORDER — FENTANYL CITRATE 50 UG/ML
100 INJECTION, SOLUTION INTRAMUSCULAR; INTRAVENOUS
Status: DISCONTINUED | OUTPATIENT
Start: 2021-01-11 | End: 2021-01-11 | Stop reason: HOSPADM

## 2021-01-11 RX ADMIN — SODIUM CHLORIDE 1000 ML: 900 INJECTION, SOLUTION INTRAVENOUS at 15:14

## 2021-01-11 NOTE — PROCEDURES
(EGD) Esophagogastroduodenoscopy (UPPER ENDOSCOPY) Procedure Note  [facility] [place]  __________________________________________________________________________________________________________________________      1/11/2021     Patient: Vitaliy Medina YOB: 1960 Gender: female Age: 64 y.o. INDICATION:  Cerebral palsy all her life in wheel chair can walk with a walker. She has been c/o constant epigastric pain a burning sensation x 4 months  3 and rarely to 10 /10. no radiation. the pain is increased by immediately by any food. she lost 25 lbs because her mother passed away in June 2020 from COPD. She has chronic GERD for 30 years on Nexium 20 mg bid x 2 months before she was taking Pantoprazole 40 mg daily for years  and Carafate tid x 4 months but neither one improved her symptoms. She still has heartburns, frequent belching, nausea after eating with food regurgitation but no vomiting. She has one bm q 2 days. stools are huge  never had colonoscopy. No rectal bleeding or melena. She doesn't know her father side but no cancer. She has multiple orthopedic surgeries, cholecystectomy. PMH HTN no CAD, CVA or  She has an abdominal baclofen pump for her spasm x 9 years EGD was done 30 years ago after she had a gastric parasite in her stomach that was treated 30 years ago. So we need to do an EGD before making any recommendation    : Sima Rush MD    Referring Provider:  Case Guevara MD    Sedation:  Versed 11 mg IV, Fentanyl 200 mcg IV    Procedure Details:  After infomed consent was obtained for the procedure, with all risks and benefits of procedure explained to the family the patient was taken to the endoscopy suite and placed in the left lateral decubitus position. Following sequential administration of sedation as per above, the endoscope was inserted into the mouth and advanced under direct vision to third portion of the duodenum.   A careful inspection was made as the gastroscope was withdrawn, including a retroflexed view of the proximal stomach; findings and interventions are described below. OROPHARYNX: The vocal Cords and the larynx are normal.   ESOPHAGUS: The proximal and mid oesophagus are normal. The Z-Line is irregular located at 32 cm. There are 2 short segments of Villa's like metaplasia in the distal esophagus one is > 1.1 cm and the second 10 mm. We took 3 biopsies. 2 cm hiatal hernia. The diaphragmatic hiatus is located at 35 cm. STOMACH:  There are Numerous Fundic and hyperplastic polyps in the gastric body and on the larger curvature varing in sizes between 5 to 13 mm. I removed with hot snare at least 30 polyps because of their number and large size but only retrieved some with the Sharon Cough net for histology analysis. The fundus on antegrade and retroflex views is normal. The cardia, lesser curvature, the antrum, and pylorus are normal.   DUODENUM: The bulb, second, third portions and major papilla are normal.  Therapies:  Hot snare Polypectomy x 30 polyps, 3 biopsies of the short Villa's and retrieval of the polyps with the Christianson net    Specimen: Two           Complications:   None    EBL:  negligible  IMPRESSION: The Z-Line is irregular located at 32 cm. There are 2 short segments of Villa's like metaplasia in the distal esophagus one is > 1.1 cm and the second 10 mm. We took 3 biopsies. 2 cm hiatal hernia. The diaphragmatic hiatus is located at 35 cm. Numerous Fundic and hyperplastic polyps in the gastric body and on the larger curvature varing in sizes between 5 to 13 mm. I removed with hot snare at least 30 polyps because of their number and large size but only retrieved some with the Sharon Cough net for histology analysis. High tolerance to sedation. RECOMMENDATION:  may resume anti reflux diet. Avoid all NSAID's. Make a FU appointment at the office. Consider doing the colonoscopy.  continue taking the Nexium or equivalent for 2 weeks and then daily for life. Also continue the Carafate for 2 weeks then stop.      Assistant: None    --Telma Zavala MD on 1/11/2021 at 4:06 PM

## 2021-01-11 NOTE — H&P
Completed Orders (This Visit)  Order Details Reason Side Interpretation Result Initial Treatment Date Region     Bowel prep before procedure: polyethylene glycol. Hold oral anticoagulation medication: to be determined by PCP. Hold antiplatelet medication: to be determined by PCP. Hold oral diabetic medication: to be determined by PCP. Assessment/Plan  # Detail Type Description    1. Assessment Epigastric pain (R10.13). Patient Plan Cerebral palsy all her life in wheel chair can walk with a walker. She has been c/o constant epigastric pain a burning sensation x 4 months  3 and rarely to 10 /10. no radiation. the pain is increased by immediately by any food. she lost 25 lbs because her mother passed away in June 2020 from COPD. She has chronic GERD for 30 years on Nexium 20 mg bid x 2 months before she was taking Pantoprazole 40 mg daily for years  and Carafate tid x 4 months but neither one improved her symptoms. She still has heartburns, frequent belching, nausea after eating with food regurgitation but no vomiting. She has one bm q 2 days. stools are huge  never had colonoscopy. No rectal bleeding or melena. She doesn't know her father side but no cancer. She has multiple orthopedic surgeries, cholecystectomy  PMH HTN no CAD, CVA or  She has an abdominal baclofen pump for her spasm x 9 years   EGD was done 30 years ago after she had a gastric parasite in her stomach that was treated 30 years ago   so we need to do an EGD before making any recommendation     I explained to her the procedure of upper endoscopy or EGD, the alternative and the risks involved which include but not limited to aspiration, bleeding perforation or reaction to sedation. She was agreeable to this. Plan Orders Iron and TIBC to be performed. and Occult Blood, Fecal, IA to be performed. 2. Assessment Anemia (D64.9).     Patient Plan mild anemia at 11.5 when it was before 13 with normal MCV she is taking Vit B12 injection. TSH is normal. need to check the iron and occult blood in the stools. eventually she needs a colonoscopy         3. Assessment Constipation (K59.00). Patient Plan She has one bm every 2 days never had colonoscopy bloated. told her she needs to treat her constipation. advised to consume more water, cooked vegetables and fresh fruits and vegetables. Should not hesitate to Take Miralax as needed or on regular basis once or more to control her symptoms but sometimes have to add another laxative or take even an enema if the above do not work. we need to do a colonoscopy later as she never had one         4. Other Orders Orders not associated to today's assessments. Plan Orders She will be scheduled for GASTROENTEROLOGY PROCEDURE, Next Lab Date is within 1 Month on 01/06/2021. Clinical information/comments: at location [facility]. The surgeon scheduled is Jesu Rivera MD. An assistant has not been requested. This 64year old  patient was referred by Sol Toro. This 64year old female presents for GERD. History of Present Illness:  1. GERD   The onset of the heartburn was 4 months ago. The problem is not changing. The patient reports epigastric pain and heartburn. It occurs constantly. The symptoms are relieved by antacids and sucralfate. Associated symptoms include nausea, post-nasal drainage and reflux. Additional information: Pt states that whenever she  takes sucralfate medication is has shortness of breathe. BM 1 every two days very large stool.           PROBLEM LIST:     Problem Description Onset Date Chronic Clinical Status Notes   Infantile cerebral palsy 03/05/2012 Y     Benign essential hypertension 08/12/2013 Y     Gastroesophageal reflux disease 08/12/2013 Y     Depressive disorder 08/12/2013 Y     Acquired deformity of ankle AND/OR foot 08/17/2013 Y     Generalized anxiety disorder 10/25/2013 Y     Anemia in mother complicating childbirth 74/95/2857 N Obstipation 2020 N     Epigastric discomfort 2020 N               PAST MEDICAL/SURGICAL HISTORY   (Detailed)    Disease/disorder Onset Date Management Date Comments   Hardware removal right ankle/foot 2013      Right ankle surgery         cysto, urethral dilation, 10/08/2019      cysto, urethral dilation 2014      cysto, urehtral dilation 2012      Baclofen Pump       Cholecystectomy     congenital hip dysplasia  Left Hip Surgery 1976      [facility] [place] 1966      [facility] 1966      OBSTETRIC HISTORY:  Not currently pregnant. Family History  (Detailed)  Relationship Family Member Name  Age at Death Condition Onset Age Cause of Death       Family history of GERD  N       Family history of Asthma  N   Brother  N  Alive and well  N   Father  N  Alive and well  N   Mother  N  Depression  N         Social History:  (Detailed)  Tobacco use reviewed. Preferred language is [place]. EDUCATION/EMPLOYMENT/OCCUPATION  The patient has a(n) high school education. Patient has special needs: CP.   MARITAL STATUS/FAMILY/SOCIAL SUPPORT  Marital status: Single   CHILDREN  Does not have children. [address] [place] [address] status is stable/permanent. The patient lives with mother and father. Her mother is her best frined. She reports she has a lot of friends but their cars can't accomodate her wheelchair  Tobacco use status: Never smoked tobacco.  Smoking status: Never smoker. TOBACCO SCREENING:  Patient has never used tobacco. Patient has not used tobacco in the last 30 days. Patient has not used smokeless tobacco in the last 30 days. SMOKING STATUS  Type Smoking Status Usage Per Day Years Used Pack Years Total Pack Years    Never smoker         ALCOHOL  There is no history of alcohol use. CAFFEINE  The patient uses caffeine: tea.  EXPERIENCE  Patient has no  experience.             Medications (active prior to today)  Medication Name Sig Description Start Date Stop Date Refilled Rx Elsewhere   Vitamin D3 1,000 unit tablet Twice a day 08/06/2013   N   Vitamin B-12 500 mcg tablet 1 daily 08/06/2013   N   baclofen 10 mg tablet  //   Y   Vitamin C 1,000 mg tablet  //   Y   ramipril 5 mg capsule take 1 capsule by oral route 2 times every day 03/13/2019   N   Lasix 20 mg tablet TAKE 1 TABLET BY MOUTH ONE TO 2 TIMES EVERY DAY FOR FLUID RETENTION 09/11/2019 12/15/2020 09/11/2019 N   lactulose 10 gram/15 mL oral solution take 15 milliliter by oral route 2 times every day 01/29/2020 12/15/2020 01/29/2020 N   CITALOPRAM TAB 40MG TAKE 1 TABLET DAILY 02/28/2020 02/28/2020 N   amitriptyline 10 mg tablet take 1-2  tablet (10-20 MG)  by oral route  every day 07/29/2020 07/29/2020 N   neomycin-polymyxin-hydrocort 3.5 mg-10,000 unit/mL-1 % ear drops,susp instill 2 - 3 drop by otic route 2 times every day into affected ear(s) 07/29/2020 12/15/2020  N   omeprazole 40 mg capsule,delayed release take 1 capsule by oral route  every day before a meal 08/24/2020 12/15/2020  N   Carafate 1 gram tablet take 1 tablet by oral route 4 times every day on an empty stomach 1 hour before meals and at bedtime 10/05/2020  10/05/2020 N   RAMIPRIL 10 MG CAPSULE TAKE 1 CAPSULE BY MOUTH EVERY DAY 10/21/2020  10/21/2020 N   PANTOPRAZOLE TAB 40MG DR TAKE 1 TABLET DAILY 12/14/2020 12/15/2020 12/14/2020 N   sucralfate 1 gram tablet take 1 tablet by oral route 4 times every day on an empty stomach 1 hour before meals and at bedtime // 01/04/2021 01/04/2021 Y   furosemide 20 mg tablet take 1 tablet by oral route  every day //   Y   citalopram 40 mg tablet take 1 tablet by oral route  every day //   Y   Nexium 20 mg capsule,delayed release take 1 capsule by oral route  every day //   Y     Medication Reconciliation  Medications reconciled today.   Medication Reviewed  Adherence Medication Name Sig Desc Elsewhere Status   taking as directed Vitamin D3 1,000 unit tablet Twice a day N Verified taking as directed Vitamin B-12 500 mcg tablet 1 daily N Verified   taking as directed baclofen 10 mg tablet  Y Verified   taking as directed Vitamin C 1,000 mg tablet  Y Verified   taking as directed ramipril 5 mg capsule take 1 capsule by oral route 2 times every day N Verified   taking as directed lactulose 10 gram/15 mL oral solution take 15 milliliter by oral route 2 times every day N Verified   taking as directed Lasix 20 mg tablet TAKE 1 TABLET BY MOUTH ONE TO 2 TIMES EVERY DAY FOR FLUID RETENTION N Verified   taking as directed CITALOPRAM TAB 40MG TAKE 1 TABLET DAILY N Verified   taking as directed amitriptyline 10 mg tablet take 1-2  tablet (10-20 MG)  by oral route  every day N Verified   taking as directed omeprazole 40 mg capsule,delayed release take 1 capsule by oral route  every day before a meal N Verified   taking as directed neomycin-polymyxin-hydrocort 3.5 mg-10,000 unit/mL-1 % ear drops,susp instill 2 - 3 drop by otic route 2 times every day into affected ear(s) N Verified   taking as directed RAMIPRIL 10 MG CAPSULE TAKE 1 CAPSULE BY MOUTH EVERY DAY N Verified   taking as directed Carafate 1 gram tablet take 1 tablet by oral route 4 times every day on an empty stomach 1 hour before meals and at bedtime N Verified   taking as directed PANTOPRAZOLE TAB 40MG DR TAKE 1 TABLET DAILY N Verified     Medications (Added, Continued or Stopped today)  Start Date Medication Directions PRN Status PRN Reason Instruction Stop Date   07/29/2020 amitriptyline 10 mg tablet take 1-2  tablet (10-20 MG)  by oral route  every day N       baclofen 10 mg tablet  N      10/05/2020 Carafate 1 gram tablet take 1 tablet by oral route 4 times every day on an empty stomach 1 hour before meals and at bedtime N       citalopram 40 mg tablet take 1 tablet by oral route  every day N      02/28/2020 CITALOPRAM TAB 40MG TAKE 1 TABLET DAILY N       furosemide 20 mg tablet take 1 tablet by oral route  every day N      01/29/2020 lactulose 10 gram/15 mL oral solution take 15 milliliter by oral route 2 times every day N   12/15/2020   09/11/2019 Lasix 20 mg tablet TAKE 1 TABLET BY MOUTH ONE TO 2 TIMES EVERY DAY FOR FLUID RETENTION N   12/15/2020   07/29/2020 neomycin-polymyxin-hydrocort 3.5 mg-10,000 unit/mL-1 % ear drops,susp instill 2 - 3 drop by otic route 2 times every day into affected ear(s) N   12/15/2020    Nexium 20 mg capsule,delayed release take 1 capsule by oral route  every day N      08/24/2020 omeprazole 40 mg capsule,delayed release take 1 capsule by oral route  every day before a meal N   12/15/2020   12/14/2020 PANTOPRAZOLE TAB 40MG DR TAKE 1 TABLET DAILY N   12/15/2020   10/21/2020 RAMIPRIL 10 MG CAPSULE TAKE 1 CAPSULE BY MOUTH EVERY DAY N      03/13/2019 ramipril 5 mg capsule take 1 capsule by oral route 2 times every day N      01/04/2021 SUCRALFATE 1 GM TABLET TAKE 1 TABLET BY MOUTH 4 TIMES EVERY DAY ON AN EMPTY STOMACH 1 HOUR BEFORE MEALS AND AT BEDTIME N       sucralfate 1 gram tablet take 1 tablet by oral route 4 times every day on an empty stomach 1 hour before meals and at bedtime N   01/04/2021 08/06/2013 Vitamin B-12 500 mcg tablet 1 daily N       Vitamin C 1,000 mg tablet  N      08/06/2013 Vitamin D3 1,000 unit tablet Twice a day N        Allergies:  Ingredient Reaction (Severity) Medication Name Comment   CODEINE Sleep Walk     TERBINAFINE HCL diarrhea  Lamisil   Reviewed, no changes. ORDERS:  Status Lab Order Time Frame Comments   ordered Occult Blood, Fecal, IA     ordered Iron and TIBC       Review of System  System Neg/Pos Details   Constitutional Negative Chills, Fever and Malaise. ENMT Positive Post-nasal drainage. ENMT Negative Ear infections, Nasal congestion and Sinus Infection. Eyes Negative Double vision and Eye pain. Respiratory Negative Asthma, Pleuritic pain and Wheezing. Cardio Negative Chest pain, Edema and Irregular heartbeat/palpitations. GI Positive Nausea, Reflux.    GI Negative Abdominal pain, Change in bowel habits, Constipation, Decreased appetite, Diarrhea, Heartburn, Hematemesis and Hematochezia.  Negative Dysuria, Hematuria, Urinary frequency, Urinary incontinence and Urinary retention. Endocrine Negative Cold intolerance, Heat intolerance and Increased thirst.   Psych Negative Anxiety, Depression and Increased stress. Integumentary Negative Hives, Pruritus and Rash. MS Negative Back pain, Joint pain and Myalgia. Hema/Lymph Negative Easy bleeding, Easy bruising and Lymphadenopathy. Allergic/Immuno Negative Chemicals in work place, Contact allergy, Food allergies, Immunosuppression and Seasonal allergies. Reproductive Negative Breast lumps, Breast pain and Vaginal discharge. Vital Signs   Height  Time ft in cm Last Measured Height Position   2:43 PM 4.0 9.00 144.78 08/24/2020      Date/Time Temp Pulse BP MAP (Calculated) Arterial Line 1 BP (mmHg) BP Patient Position Resp SpO2 O2 Device O2 Flow Rate (L/min) Pre/Post Ductal Weight   01/11/21 1439 98.1 °F (36.7 °C) 66 155/74Abnormal  101   16 100 % Room air   70.3 kg (155 lb)         PHYSICAL EXAM:  Exam Findings Details   Constitutional * Nourishment - obese. Constitutional Normal No acute distress. Well developed. Eyes Normal General - Right: Normal, Left: Normal. Conjunctiva - Right: Normal, Left: Normal. Sclera - Right: Normal, Left: Normal. Cornea - Right: Normal, Left: Normal. Pupil - Right: Normal, Left: Normal.   Nose/Mouth/Throat Normal Lips/teeth/gums - Normal. Tongue - Normal. Buccal mucosa - Normal. Palate & uvula - Normal.   Neck Exam Normal Inspection - Normal. Palpation - Normal. Thyroid gland - Normal. Cervical lymph nodes - Normal.   Respiratory Normal Inspection - Normal. Auscultation - Normal. Percussion - Normal. Cough - Absent. Effort - Normal.   Cardiovascular Normal Heart rate - Regular rate. Heart sounds - Normal S1, Normal S2. Murmurs - None. Extremities - No edema.    Abdomen Normal Inspection - Normal. Appliance(s) - None. Abdominal muscles - Normal. Auscultation - Normal. Percussion - Normal. Anterior palpation - Normal, No guarding, No rebound. CVA tenderness - None. Umbilicus - Normal. Abdominal reflexes - Normal. No abdominal tenderness. No hepatic enlargement. No splenic enlargement. No hernia. No Ascites. No palpable mass. Springer's sign - Negative. Skin Normal Inspection - Normal.   Musculoskeletal Normal Hands - Left: Normal, Right: Normal.   Extremity Normal No cyanosis. No edema. Clubbing - Absent. Neurological Normal Level of consciousness - Normal. Orientation - Normal. Memory - Normal. Motor - Normal. Balance & gait - Normal. Coordination - Normal. Fine motor skills - Normal. DTRs - Normal.   Psychiatric Normal Orientation - Oriented to time, place, person & situation. Not anxious. Appropriate mood and affect. Behavior appropriate for age. Sufficient language. No memory loss. Patient Education  # Patient Education   1. Learning About Blood Transfusions   2.  Anemia From Heavy Bleeding: Care Inst~     Active Patient Care Team Members    Name Contact Agency Type Support Role Relationship Active Date Inactive Date Specialty   Leeanne Gavin   specialist    Psychiatry   Nithin Huang   specialist    Ortho   Celester Nephew   Patient provider PCP   Family Practice   Celester Nephew   primary care provider       Nikki Vazquez   encounter provider    Urology   Chi Presley   encounter provider    Physical Therapy   Kali Yadav   primary practice provider       Jacque Hwang   encounter provider    Gastroenterology   Tayler Quiles   encounter provider      Odilon Alston   specialist    Psychiatry   Osmel Hurd    Parent, Child is the Patient      Osmel Carrillomateus   Emergency Contact Father        No change in H&P

## 2021-01-11 NOTE — DISCHARGE INSTRUCTIONS
Eb Liu  [id number]  1960    EGD DISCHARGE INSTRUCTIONS  Discomfort:  Sore throat- throat lozenges or warm salt water gargle  redness at IV site- apply warm compress to area; if redness or soreness persist- contact your physician  Gaseous discomfort- walking, belching will help relieve any discomfort  You may not operate a vehicle until the next day  You may not engage in an occupation involving machinery or appliances until the next day  You may not drink alcoholic beverages until the next day  Avoid making any critical decisions for at least 24 hour    DIET   You may not resume your regular diet. Antireflux diet. ACTIVITY  You may not resume your normal daily activities   Spend the remainder of the day resting -  avoid any strenuous activity. CALL M.D. ANY SIGN OF   Increasing pain, nausea, vomiting  Abdominal distension (swelling)  New increased bleeding (oral or rectal)  Fever (chills)  Pain in chest area  Bloody discharge from nose or mouth  Shortness of breath     You may not take any Advil, Aspirin, Ibuprofen, Motrin, Aleve, or Goodys  ONLY  Tylenol as needed for pain. Follow-up Instructions: Follow-up in the office as scheduled or make a follow-up appointment in 2 weeks.      Pablito Gonzalez MD  January 11, 2021      DISCHARGE SUMMARY from Nurse    The following personal items collected during your admission are returned to you:   Dental Appliance: Dental Appliances: None  Vision: Visual Aid: None  Hearing Aid:    Jewelry:    Clothing:    Other Valuables:    Valuables sent to safe:              PATIENT INSTRUCTIONS:    After general anesthesia or intravenous sedation, for 24 hours or while taking prescription Narcotics:  · Limit your activities  · Do not drive and operate hazardous machinery  · Do not make important personal or business decisions  · Do  not drink alcoholic beverages  · If you have not urinated within 8 hours after discharge, please contact your surgeon on call.    Report the following to your surgeon:  · Excessive pain, swelling, redness or odor of or around the surgical area  · Temperature over 100.5  · Nausea and vomiting lasting longer than 4 hours or if unable to take medications  · Any signs of decreased circulation or nerve impairment to extremity: change in color, persistent  numbness, tingling, coldness or increase pain  · Any questions      Follow-up Appointments   Procedures    FOLLOW UP VISIT Appointment in: Two Weeks     Standing Status:   Standing     Number of Occurrences:   1     Order Specific Question:   Appointment in     Answer: Two Weeks       What to do at Home:  Recommended activity: as above,     If you experience any of the following symptoms as above, please follow up with Dr. Marion Barba. *  Please give a list of your current medications to your Primary Care Provider. *  Please update this list whenever your medications are discontinued, doses are      changed, or new medications (including over-the-counter products) are added. *  Please carry medication information at all times in case of emergency situations. These are general instructions for a healthy lifestyle:    No smoking/ No tobacco products/ Avoid exposure to second hand smoke    Surgeon General's Warning:  Quitting smoking now greatly reduces serious risk to your health. Obesity, smoking, and sedentary lifestyle greatly increases your risk for illness    A healthy diet, regular physical exercise & weight monitoring are important for maintaining a healthy lifestyle    You may be retaining fluid if you have a history of heart failure or if you experience any of the following symptoms:  Weight gain of 3 pounds or more overnight or 5 pounds in a week, increased swelling in our hands or feet or shortness of breath while lying flat in bed. Please call your doctor as soon as you notice any of these symptoms; do not wait until your next office visit.     Recognize signs and symptoms of STROKE:    F-face looks uneven    A-arms unable to move or move unevenly    S-speech slurred or non-existent    T-time-call 911 as soon as signs and symptoms begin-DO NOT go       Back to bed or wait to see if you get better-TIME IS BRAIN. The discharge information has been reviewed with the patient and parent. The patient and parent verbalized understanding. Warning Signs of HEART ATTACK     Call 911 if you have these symptoms:   Chest discomfort. Most heart attacks involve discomfort in the center of the chest that lasts more than a few minutes, or that goes away and comes back. It can feel like uncomfortable pressure, squeezing, fullness, or pain.  Discomfort in other areas of the upper body. Symptoms can include pain or discomfort in one or both arms, the back, neck, jaw, or stomach.  Shortness of breath with or without chest discomfort.  Other signs may include breaking out in a cold sweat, nausea, or lightheadedness. Don't wait more than five minutes to call 911 - MINUTES MATTER! Fast action can save your life. Calling 911 is almost always the fastest way to get lifesaving treatment. Emergency Medical Services staff can begin treatment when they arrive -- up to an hour sooner than if someone gets to the hospital by car. The discharge information has been reviewed with the patient and caregiver. The patient and caregiver verbalized understanding. Discharge medications reviewed with the patient and guardian and appropriate educational materials and side effects teaching were provided.     Patient armband removed and shredded

## 2021-03-17 RX ORDER — SODIUM CHLORIDE 0.9 % (FLUSH) 0.9 %
5-40 SYRINGE (ML) INJECTION EVERY 8 HOURS
Status: CANCELLED | OUTPATIENT
Start: 2021-03-17

## 2021-03-17 RX ORDER — DIPHENHYDRAMINE HYDROCHLORIDE 50 MG/ML
50 INJECTION, SOLUTION INTRAMUSCULAR; INTRAVENOUS ONCE
Status: CANCELLED | OUTPATIENT
Start: 2021-03-17 | End: 2021-03-17

## 2021-03-17 RX ORDER — EPINEPHRINE 0.1 MG/ML
1 INJECTION INTRACARDIAC; INTRAVENOUS
Status: CANCELLED | OUTPATIENT
Start: 2021-03-17 | End: 2021-03-18

## 2021-03-17 RX ORDER — ATROPINE SULFATE 0.1 MG/ML
0.5 INJECTION INTRAVENOUS
Status: CANCELLED | OUTPATIENT
Start: 2021-03-17 | End: 2021-03-18

## 2021-03-17 RX ORDER — DEXTROMETHORPHAN/PSEUDOEPHED 2.5-7.5/.8
1.2 DROPS ORAL
Status: CANCELLED | OUTPATIENT
Start: 2021-03-17

## 2021-03-17 RX ORDER — SODIUM CHLORIDE 0.9 % (FLUSH) 0.9 %
5-40 SYRINGE (ML) INJECTION AS NEEDED
Status: CANCELLED | OUTPATIENT
Start: 2021-03-17

## 2021-03-22 ENCOUNTER — HOSPITAL ENCOUNTER (OUTPATIENT)
Dept: PREADMISSION TESTING | Age: 61
Discharge: HOME | End: 2021-03-22
Payer: MEDICARE

## 2021-03-22 PROCEDURE — U0003 INFECTIOUS AGENT DETECTION BY NUCLEIC ACID (DNA OR RNA); SEVERE ACUTE RESPIRATORY SYNDROME CORONAVIRUS 2 (SARS-COV-2) (CORONAVIRUS DISEASE [COVID-19]), AMPLIFIED PROBE TECHNIQUE, MAKING USE OF HIGH THROUGHPUT TECHNOLOGIES AS DESCRIBED BY CMS-2020-01-R: HCPCS

## 2021-03-23 LAB — SARS-COV-2, COV2NT: NOT DETECTED

## 2021-03-24 ENCOUNTER — HOSPITAL ENCOUNTER (OUTPATIENT)
Age: 61
Setting detail: OUTPATIENT SURGERY
Discharge: HOME | End: 2021-03-24
Attending: INTERNAL MEDICINE | Admitting: INTERNAL MEDICINE
Payer: MEDICARE

## 2021-03-24 VITALS
WEIGHT: 155 LBS | RESPIRATION RATE: 16 BRPM | BODY MASS INDEX: 34.87 KG/M2 | HEART RATE: 89 BPM | SYSTOLIC BLOOD PRESSURE: 104 MMHG | TEMPERATURE: 97.6 F | OXYGEN SATURATION: 100 % | DIASTOLIC BLOOD PRESSURE: 60 MMHG | HEIGHT: 56 IN

## 2021-03-24 PROCEDURE — 2709999900 HC NON-CHARGEABLE SUPPLY: Performed by: INTERNAL MEDICINE

## 2021-03-24 PROCEDURE — 74011250636 HC RX REV CODE- 250/636: Performed by: INTERNAL MEDICINE

## 2021-03-24 PROCEDURE — G0500 MOD SEDAT ENDO SERVICE >5YRS: HCPCS | Performed by: INTERNAL MEDICINE

## 2021-03-24 PROCEDURE — 76040000008: Performed by: INTERNAL MEDICINE

## 2021-03-24 PROCEDURE — 77030040361 HC SLV COMPR DVT MDII -B: Performed by: INTERNAL MEDICINE

## 2021-03-24 PROCEDURE — 77030039961 HC KT CUST COLON BSC -D: Performed by: INTERNAL MEDICINE

## 2021-03-24 PROCEDURE — 99153 MOD SED SAME PHYS/QHP EA: CPT | Performed by: INTERNAL MEDICINE

## 2021-03-24 RX ORDER — LUBIPROSTONE 24 UG/1
24 CAPSULE, GELATIN COATED ORAL 2 TIMES DAILY WITH MEALS
COMMUNITY

## 2021-03-24 RX ORDER — MIDAZOLAM HYDROCHLORIDE 1 MG/ML
.25-5 INJECTION, SOLUTION INTRAMUSCULAR; INTRAVENOUS
Status: DISCONTINUED | OUTPATIENT
Start: 2021-03-24 | End: 2021-03-24 | Stop reason: HOSPADM

## 2021-03-24 RX ORDER — FENTANYL CITRATE 50 UG/ML
100 INJECTION, SOLUTION INTRAMUSCULAR; INTRAVENOUS
Status: DISCONTINUED | OUTPATIENT
Start: 2021-03-24 | End: 2021-03-24 | Stop reason: HOSPADM

## 2021-03-24 RX ORDER — NALOXONE HYDROCHLORIDE 0.4 MG/ML
0.4 INJECTION, SOLUTION INTRAMUSCULAR; INTRAVENOUS; SUBCUTANEOUS
Status: DISCONTINUED | OUTPATIENT
Start: 2021-03-24 | End: 2021-03-24 | Stop reason: HOSPADM

## 2021-03-24 RX ORDER — FLUMAZENIL 0.1 MG/ML
0.2 INJECTION INTRAVENOUS
Status: DISCONTINUED | OUTPATIENT
Start: 2021-03-24 | End: 2021-03-24 | Stop reason: HOSPADM

## 2021-03-24 RX ORDER — SODIUM CHLORIDE 9 MG/ML
1000 INJECTION, SOLUTION INTRAVENOUS CONTINUOUS
Status: DISCONTINUED | OUTPATIENT
Start: 2021-03-24 | End: 2021-03-24 | Stop reason: HOSPADM

## 2021-03-24 RX ADMIN — SODIUM CHLORIDE 1000 ML: 900 INJECTION, SOLUTION INTRAVENOUS at 12:15

## 2021-03-24 NOTE — DISCHARGE INSTRUCTIONS
Abel Nolasco   DISCHARGE SUMMARY from Nurse    PATIENT INSTRUCTIONS:    After general anesthesia or intravenous sedation, for 24 hours or while taking prescription Narcotics:  · Limit your activities  · Do not drive and operate hazardous machinery  · Do not make important personal or business decisions  · Do  not drink alcoholic beverages  · If you have not urinated within 8 hours after discharge, please contact your surgeon on call. Report the following to your surgeon:  · Excessive pain, swelling, redness or odor of or around the surgical area  · Temperature over 100.5  · Nausea and vomiting lasting longer than 4 hours or if unable to take medications  · Any signs of decreased circulation or nerve impairment to extremity: change in color, persistent  numbness, tingling, coldness or increase pain  · Any questions    What to do at Home:  Recommended activity: as above ,    If you experience any of the following symptoms as above, please follow up with Doctor Alley Rivera. *  Please give a list of your current medications to your Primary Care Provider. *  Please update this list whenever your medications are discontinued, doses are      changed, or new medications (including over-the-counter products) are added. *  Please carry medication information at all times in case of emergency situations. These are general instructions for a healthy lifestyle:    No smoking/ No tobacco products/ Avoid exposure to second hand smoke  Surgeon General's Warning:  Quitting smoking now greatly reduces serious risk to your health.     Obesity, smoking, and sedentary lifestyle greatly increases your risk for illness    A healthy diet, regular physical exercise & weight monitoring are important for maintaining a healthy lifestyle    You may be retaining fluid if you have a history of heart failure or if you experience any of the following symptoms:  Weight gain of 3 pounds or more overnight or 5 pounds in a week, increased swelling in our hands or feet or shortness of breath while lying flat in bed. Please call your doctor as soon as you notice any of these symptoms; do not wait until your next office visit. The discharge information has been reviewed with the patient and caregiver. The patient and caregiver verbalized understanding. Discharge medications reviewed with the patient, guardian and caregiver and appropriate educational materials and side effects teaching were provided. ___________________________________________________________________________________________________________________________________  [id number]  1960    COLON DISCHARGE INSTRUCTIONS    Discomfort:  Redness at IV site- apply warm compress to area; if redness or soreness persist- contact your physician  There may be a slight amount of blood passed from the rectum  Gaseous discomfort- walking, belching will help relieve any discomfort  You may not operate a vehicle til the next day. You may not engage in an occupation involving machinery or appliances til the next day. You may not drink alcoholic beverages til the next day. DIET:   High fiber diet. ACTIVITY:  You may not  resume your normal daily activities til the next day. it is recommended that you spend the remainder of the day resting -  avoid any strenuous activity. CALL M.D.  IF ANY SIGN OF:   Increasing pain, nausea, vomiting  Abdominal distension (swelling)  New increased bleeding (oral or rectal)  Fever (chills)  Pain in chest area  Bloody discharge from nose or mouth  Shortness of breath    You may  take any Advil, Aspirin, Ibuprofen, Motrin, Aleve, or Goodys but preferably Tylenol as needed for pain. Post procedure diagnosis:  TORTUOUS COLON;     Follow-up Instructions: Your follow up colonoscopy will be in 10 years.         Susan Liu MD  March 24, 2021   Patient armband removed and shredded

## 2021-03-24 NOTE — H&P
Assessment/Plan  # Detail Type Description    1. Assessment Epigastric pain (R10.13). Patient Plan 60 yrs. old female pt. referred by Dr. Светлана Pemberton for Epigastric pain, and GERD. EGD 1960. The Z- line is irregular located at 32cm. There are 2 short segments of Villa's like metaplasia in the distal esophagus one is 1 > 1.1 cm and the second 10 mm. We took 3 biopsies . 2 cm hiatal hernia. The diaphragmatic hiatus is located at [address] [facility]. Numerous Fundic and hyperplastic polyps in the gastric body and on the larger curvature varying in sizes between 5 to 13 mm. I removed with hot snare at least 30 polyps because of their number and large size but only retrieved some with [place] net histology analysis. High tolerance to sedation. she is not better she still has epigastric pain after eating with heartburns nausea no vomiting or dysphagia. she is chronically constipated has one bm every 2 days but rarely every 6 days. she is taking Lactulose and COlace every 2 days. she never had colonoscopy. no rectal bleeding. no Occult blood in the stools but has mild iron deficiency anemia. presently taking Pantoprazole 40 mg daily. she lost 20 lbs because she is afraid to eat. So in my opinion the next step is to do CT scan of the abdomen and pelvis. told her she needs to treat her constipation. I gave her a prescription of AMitiza 24 mcg bid take Colace bid and may have to add Miralax  She is obese bed ridden or in a wheel chair because of cerebral palsy hse has baclofen pump in the right flank because of leg spasm  No family history of colon neoplasm. Will book for her first screening colonoscopy. I explained to the patient the procedure of colonoscopy and the risk involved which includes but not limited to bleeding, perforation, infection or missing a lesion if the bowels are not well clean or are unusually tortuous and difficult. I gave her the  Suprep. I  answered her questions.   She was agreeable to proceed with this 2. Assessment Constipation (K59.00). Patient Plan see above has to be treated         3. Assessment Iron deficiency anemia, unspecified (D50.9). Patient Plan mild with hem positive stools. never had colonoscopy see above                 This 64year old female presents for follow up of EGD. History of Present Illness:  1. Follow Up of EGD   60 yrs. old female pt. referred by Dr. Radha Light for Epigastric pain, and GERD. EGD 1960. The Z- line is irregular located at 32cm. There are 2 short segments of Villa's like metaplasia in the distal esophagus one is 1 > 1.1 cm and the second 10 mm. We took 3 biopsies . 2 cm hiatal hernia. The diaphragmatic hiatus is located at [address] [facility] Floor. Numerous Fundic and hyperplastic polyps in the gastric body and on the larger curvature varing in sizes between 5 to 13 mm. I removed with hot snare at least 30 polyps because of their number and large size but only retrieved some with [place] net histology analysis.  High tolerance to sedation          PROBLEM LIST:     Problem Description Onset Date Chronic Clinical Status Notes   Infantile cerebral palsy 03/05/2012 Y     Benign essential hypertension 08/12/2013 Y     Gastroesophageal reflux disease 08/12/2013 Y     Depressive disorder 08/12/2013 Y     Acquired deformity of ankle AND/OR foot 08/17/2013 Y     Generalized anxiety disorder 10/25/2013 Y     Anemia in mother complicating childbirth 69/59/0321 N     Obstipation 12/16/2020 N     Epigastric discomfort 12/16/2020 N               PAST MEDICAL/SURGICAL HISTORY   (Detailed)    Disease/disorder Onset Date Management Date Comments   Hardware removal right ankle/foot 2013      Right ankle surgery 2013        cysto, urethral dilation, 10/08/2019      cysto, urethral dilation 09/30/2014      cysto, urehtral dilation 03/14/2012      Baclofen Pump 2012      Cholecystectomy 1993    congenital hip dysplasia  Left Hip [address] [facility] HISTORY:  Not currently pregnant. Family History  (Detailed)  Relationship Family Member Name  Age at Death Condition Onset Age Cause of Death       Family history of GERD  N       Family history of Asthma  N   Brother  N  Alive and well  N   Father  N  Alive and well  N   Mother  N  Depression  N         Social History:  (Detailed)  Tobacco use reviewed. Preferred language is Daniel Staggers. EDUCATION/EMPLOYMENT/OCCUPATION  The patient has a(n) high school education. Patient has special needs: CP.   MARITAL STATUS/FAMILY/SOCIAL SUPPORT  Marital status: Single   CHILDREN  Does not have children. [address] [place] [address] status is stable/permanent. The patient lives with mother and father. Her mother is her best frined. She reports she has a lot of friends but their cars can't accomodate her wheelchair  Tobacco use status: Never smoked tobacco.  Smoking status: Never smoker. TOBACCO SCREENING:  Patient has never used tobacco. Patient has not used tobacco in the last 30 days. Patient has not used smokeless tobacco in the last 30 days. SMOKING STATUS  Type Smoking Status Usage Per Day Years Used Pack Years Total Pack Years    Never smoker         ALCOHOL  There is no history of alcohol use. CAFFEINE  The patient uses caffeine: tea.  EXPERIENCE  Patient has no  experience.             Medications (active prior to today)  Medication Name Sig Description Start Date Stop Date Refilled Rx Elsewhere   Vitamin D3 1,000 unit tablet Twice a day 2013   N   Vitamin B-12 500 mcg tablet 1 daily 2013   N   baclofen 10 mg tablet  //   Y   Vitamin C 1,000 mg tablet  //   Y   furosemide 20 mg tablet take 1 tablet by oral route  every day //   Y   SUCRALFATE 1 GM TABLET TAKE 1 TABLET BY MOUTH 4 TIMES EVERY DAY ON AN EMPTY STOMACH 1 HOUR BEFORE MEALS AND AT BEDTIME 2021 N   RAMIPRIL 10 MG CAPSULE TAKE 1 CAPSULE BY MOUTH EVERY DAY 2021 N AMITRIPTYLINE HCL 10 MG TAB TAKE 1-2 TABLET (10-20 MG) BY ORAL ROUTE EVERY DAY 01/28/2021 01/28/2021 N   Stool Softener 100 mg capsule take 1 capsule by oral route 2 times every day at bedtime as needed as needed //   Y   zinc 50 mg tablet  //   Y   calcium carb,cit  mg calcium-vit D3 500 unit tablet,ext. release  //   Y   Ativan 0.5 mg tablet take 1 tablet by oral route 2 times every day as needed 02/17/2021 02/17/2021 N   pantoprazole 40 mg tablet,delayed release take 1 tablet by oral route  every day //   Y   Celexa 40 mg tablet TAKE 1 TABLET DAILY 02/17/2021 02/17/2021 N   Fruit and Vegetable Daily 5 mg-6 mg-150 mg capsule  //   Y     Medication Reconciliation  Medications reconciled today. Medication Reviewed  Adherence Medication Name Sig Desc Elsewhere Status   taking as directed Vitamin D3 1,000 unit tablet Twice a day N Verified   taking as directed Vitamin B-12 500 mcg tablet 1 daily N Verified   taking as directed baclofen 10 mg tablet  Y Verified   taking as directed Vitamin C 1,000 mg tablet  Y Verified   taking as directed RAMIPRIL 10 MG CAPSULE TAKE 1 CAPSULE BY MOUTH EVERY DAY N Verified   taking as directed furosemide 20 mg tablet take 1 tablet by oral route  every day Y Verified   taking as directed SUCRALFATE 1 GM TABLET TAKE 1 TABLET BY MOUTH 4 TIMES EVERY DAY ON AN EMPTY STOMACH 1 HOUR BEFORE MEALS AND AT BEDTIME N Verified   taking as directed Stool Softener 100 mg capsule take 1 capsule by oral route 2 times every day at bedtime as needed as needed Y Verified   taking as directed AMITRIPTYLINE HCL 10 MG TAB TAKE 1-2 TABLET (10-20 MG) BY ORAL ROUTE EVERY DAY N Verified   taking as directed zinc 50 mg tablet  Y Verified   taking as directed calcium carb,cit  mg calcium-vit D3 500 unit tablet,ext. release  Y Verified   taking as directed Ativan 0.5 mg tablet take 1 tablet by oral route 2 times every day as needed N Verified   taking as directed Celexa 40 mg tablet TAKE 1 TABLET DAILY N Verified   taking as directed pantoprazole 40 mg tablet,delayed release take 1 tablet by oral route  every day Y Verified   taking as directed Fruit and Vegetable Daily 5 mg-6 mg-150 mg capsule  Y Verified     Medications (Added, Continued or Stopped today)  Start Date Medication Directions PRN Status PRN Reason Instruction Stop Date   02/18/2021 Amitiza 24 mcg capsule take 1 capsule by oral route 2 times every day with food and water N      01/28/2021 AMITRIPTYLINE HCL 10 MG TAB TAKE 1-2 TABLET (10-20 MG) BY ORAL ROUTE EVERY DAY N      02/17/2021 Ativan 0.5 mg tablet take 1 tablet by oral route 2 times every day as needed N       baclofen 10 mg tablet  N       calcium carb,cit  mg calcium-vit D3 500 unit tablet,ext. release  N      02/17/2021 Celexa 40 mg tablet TAKE 1 TABLET DAILY N       Fruit and Vegetable Daily 5 mg-6 mg-150 mg capsule  N       furosemide 20 mg tablet take 1 tablet by oral route  every day N       pantoprazole 40 mg tablet,delayed release take 1 tablet by oral route  every day N      01/27/2021 RAMIPRIL 10 MG CAPSULE TAKE 1 CAPSULE BY MOUTH EVERY DAY N       Stool Softener 100 mg capsule take 1 capsule by oral route 2 times every day at bedtime as needed as needed Y      01/04/2021 SUCRALFATE 1 GM TABLET TAKE 1 TABLET BY MOUTH 4 TIMES EVERY DAY ON AN EMPTY STOMACH 1 HOUR BEFORE MEALS AND AT BEDTIME N      08/06/2013 Vitamin B-12 500 mcg tablet 1 daily N       Vitamin C 1,000 mg tablet  N      08/06/2013 Vitamin D3 1,000 unit tablet Twice a day N       zinc 50 mg tablet  N        Allergies:  Ingredient Reaction (Severity) Medication Name Comment   CODEINE Sleep Walk     TERBINAFINE HCL diarrhea  Lamisil   Reviewed, no changes. Review of System  System Neg/Pos Details   Constitutional Negative Chills, Fever, Malaise and Weight loss. ENMT Negative Ear infections, Nasal congestion, Sinus Infection and Sore throat. Eyes Negative Double vision and Eye pain.    Respiratory Negative Asthma, Chronic cough, Dyspnea, Pleuritic pain and Wheezing. Cardio Negative Chest pain, Edema and Irregular heartbeat/palpitations. GI Negative Abdominal pain, Change in bowel habits, Constipation, Decreased appetite, Diarrhea, Dysphagia, Heartburn, Hematemesis, Hematochezia, Melena, Nausea, Reflux and Vomiting.  Negative Dysuria, Hematuria, Urinary frequency, Urinary incontinence and Urinary retention. Endocrine Negative Cold intolerance, Heat intolerance and Increased thirst.   Neuro Negative Dizziness, Headache, Numbness, Tremors and Vertigo. Psych Negative Anxiety, Depression and Increased stress. Integumentary Negative Hives, Pruritus and Rash. MS Negative Back pain, Joint pain and Myalgia. Hema/Lymph Negative Easy bleeding, Easy bruising and Lymphadenopathy. Reproductive Negative Breast lumps, Breast pain and Vaginal discharge.      Vital Signs   Height  Time ft in cm Last Measured Height Position   3:35 PM 4.0 8.00 142.24 02/18/2021      Date/Time Temp Pulse BP MAP (Calculated) Arterial Line 1 BP (mmHg) BP Patient Position Resp SpO2 O2 Device O2 Flow Rate (L/min) Pre/Post Ductal Weight   03/24/21 1240 -- 91 -- -- -- -- 0Abnormal  100 % -- -- -- --   03/24/21 1225 -- 90 143/68Abnormal  93 -- -- 16 100 % Room air -- -- --   03/24/21 1220 -- 94 -- -- -- -- 15 100 % Room air -- -- --   03/24/21 1154 97.7 °F (36.5 °C) 97 125/61 82 -- -- 18 99 % Room air -- -- 70.3 kg (155 lb         No change in H&P

## 2021-03-24 NOTE — PROCEDURES
[facility]  Colonoscopy Procedure Report  _______________________________________________________  Patient: Laural Sovereign                                        Attending Physician: Arpan Hay MD    Patient ID: [id number]                                    Referring Physician: Will Louis MD    Exam Date: 3/24/2021      Introduction: A  64 y.o. female patient, presents for inpatient Colonoscopy    Indications: 64 yrs. old female pt. referred by Dr. Amol Ross for Epigastric pain, and GERD. EGD 1960. The Z- line is irregular located at 32cm. There are 2 short segments of Villa's like metaplasia in the distal esophagus one is 1 > 1.1 cm and the second 10 mm. We took 3 biopsies . 2 cm hiatal hernia. The diaphragmatic hiatus is located at [address] [facility]. Numerous Fundic and hyperplastic polyps in the gastric body and on the larger curvature varying in sizes between 5 to 13 mm. I removed with hot snare at least 30 polyps because of their number and large size but only retrieved some with Rc Bucker net histology analysis. High tolerance to sedation. she is not better she still has epigastric pain after eating with heartburns nausea no vomiting or dysphagia. she is chronically constipated has one bm every 2 days but rarely every 6 days. she is taking Lactulose and Colace every 2 days. she never had colonoscopy. no rectal bleeding. no Occult blood in the stools but has mild iron deficiency anemia. presently taking Pantoprazole 40 mg daily. she lost 20 lbs because she is afraid to eat. So in my opinion the next step is to do CT scan of the abdomen and pelvis. told her she needs to treat her constipation. I gave her a prescription of AMitiza 24 mcg bid now she has one bm daily. She is bed ridden or in a wheel chair because of cerebral palsy. She has baclofen pump in the right flank because of leg spasm. No family history of colon neoplasm. Consent:  The benefits, risks, and alternatives to the procedure were discussed and informed consent was obtained from the patient. Preparation: EKG, pulse, pulse oximetry and blood pressure were monitored throughout the procedure. ASA Classification: Class II- . The heart is an S1-S2 and regular heart rate and rhythm. Lungs are clear to auscultation and percussion. Abdomen is soft, nondistended, and nontender. Mental Status: awake, alert, and oriented to person, place, and time    Medications:  · Fentanyl 100 mcg IV before procedure. · Versed 5 mg IV throughout the procedure. Rectal Exam: The anus is slightly more anterior than expected. The puborectalis muscle is very tight and stiff like a band like. No Blood. Pathology Specimens:  0    Procedure: The colonoscope was passed with great difficulty through the anus under direct visualization and advanced to the cecum and 5 cm inside the terminal ileum. The scope was withdrawn and the mucosa was carefully examined. The quality of the preparation was poor. The views were very good. The patient's toleration of the procedure was excellent. The exam was done twice to the cecum. Total time is 40 minutes and withdrawal time is 20 minutes. Findings:    Rectum:   Small internal hemorrhoids. Sigmoid:   Very tortuous  Descending Colon:   Normal   Transverse Colon:   Normal   Ascending Colon:   Normal   Cecum:   Normal   Terminal Ileum:   Normal      Unplanned Events: There were no unplanned events. Estimated Blood Loss: None  Impressions: The anus is slightly more anterior than expected. The puborectalis muscle is very tight and stiff like a band like. Small internal hemorrhoids. Very Tortuous sigmoid and the whole colon is also slightly dilated and tortuous, containing large amount of thick liquid stools. . Normal Mucosa. No blood, diverticulum, polyp or AVM found. Complications: None; patient tolerated the procedure well. Recommendations:  · Discharge home when standard parameters are met.   · Resume a high fiber diet.  · Resume own medications. Avoid all NSAID's for  · Colonoscopy recommendation in 10 years with much  Better bowel prep. · On top of the Amitiza 24 mcg twice daily, take Miralax and/ or Colace 100 mg twice daily on regular basis.   Procedure Codes:    · COLONOSCOPY [[id number] (Type: Custom)]    Endoscope Information:  Model Number(s)    [id number]   Assistant: None  Signed By: Gladys Mitchell MD Date: 3/24/2021

## 2021-03-24 NOTE — PERIOP NOTES
Face to face  Discharged  instruction given to dad/ pt fand agreed with the plan. Discharged includes diet, activity limitations , medication to continue and f/u appointment. D/c to home in stable condition with care of family. full care given. Disposable diaper on for pt safety.

## 2023-04-03 ENCOUNTER — HOSPITAL ENCOUNTER (OUTPATIENT)
Facility: HOSPITAL | Age: 63
Discharge: HOME | End: 2023-04-06
Payer: MEDICARE

## 2023-04-03 DIAGNOSIS — M19.071 OSTEOARTHRITIS OF RIGHT ANKLE OR FOOT: ICD-10-CM

## 2023-04-03 PROCEDURE — 73700 CT LOWER EXTREMITY W/O DYE: CPT

## 2023-06-01 ENCOUNTER — HOSPITAL ENCOUNTER (OUTPATIENT)
Facility: HOSPITAL | Age: 63
Discharge: HOME | End: 2023-06-01
Payer: MEDICARE

## 2023-06-01 DIAGNOSIS — N35.82 OTHER URETHRAL STRICTURE, FEMALE: ICD-10-CM

## 2023-06-01 LAB
ANION GAP SERPL CALC-SCNC: 2 MMOL/L (ref 3–18)
BUN SERPL-MCNC: 22 MG/DL (ref 7–18)
BUN/CREAT SERPL: 40 (ref 12–20)
CALCIUM SERPL-MCNC: 9.6 MG/DL (ref 8.5–10.1)
CHLORIDE SERPL-SCNC: 106 MMOL/L (ref 100–111)
CO2 SERPL-SCNC: 31 MMOL/L (ref 21–32)
CREAT SERPL-MCNC: 0.55 MG/DL (ref 0.6–1.3)
EKG ATRIAL RATE: 60 BPM
EKG DIAGNOSIS: NORMAL
EKG P AXIS: 33 DEGREES
EKG P-R INTERVAL: 160 MS
EKG Q-T INTERVAL: 430 MS
EKG QRS DURATION: 64 MS
EKG QTC CALCULATION (BAZETT): 430 MS
EKG R AXIS: 55 DEGREES
EKG T AXIS: 33 DEGREES
EKG VENTRICULAR RATE: 60 BPM
ERYTHROCYTE [DISTWIDTH] IN BLOOD BY AUTOMATED COUNT: 11.8 % (ref 11.6–14.5)
GLUCOSE SERPL-MCNC: 90 MG/DL (ref 74–99)
HCT VFR BLD AUTO: 36.5 % (ref 35–45)
HGB BLD-MCNC: 11.8 G/DL (ref 12–16)
MCH RBC QN AUTO: 31.5 PG (ref 24–34)
MCHC RBC AUTO-ENTMCNC: 32.3 G/DL (ref 31–37)
MCV RBC AUTO: 97.3 FL (ref 78–100)
NRBC # BLD: 0 K/UL (ref 0–0.01)
NRBC BLD-RTO: 0 PER 100 WBC
PLATELET # BLD AUTO: 199 K/UL (ref 135–420)
PMV BLD AUTO: 10.1 FL (ref 9.2–11.8)
POTASSIUM SERPL-SCNC: 5 MMOL/L (ref 3.5–5.5)
RBC # BLD AUTO: 3.75 M/UL (ref 4.2–5.3)
SODIUM SERPL-SCNC: 139 MMOL/L (ref 136–145)
WBC # BLD AUTO: 5.9 K/UL (ref 4.6–13.2)

## 2023-06-01 PROCEDURE — 36415 COLL VENOUS BLD VENIPUNCTURE: CPT

## 2023-06-01 PROCEDURE — 80048 BASIC METABOLIC PNL TOTAL CA: CPT

## 2023-06-01 PROCEDURE — 93005 ELECTROCARDIOGRAM TRACING: CPT

## 2023-06-01 PROCEDURE — 85027 COMPLETE CBC AUTOMATED: CPT

## 2023-06-05 ENCOUNTER — ANESTHESIA EVENT (OUTPATIENT)
Facility: HOSPITAL | Age: 63
End: 2023-06-05
Payer: MEDICARE

## 2023-06-06 ENCOUNTER — HOSPITAL ENCOUNTER (OUTPATIENT)
Facility: HOSPITAL | Age: 63
Setting detail: OUTPATIENT SURGERY
Discharge: HOME | End: 2023-06-06
Attending: UROLOGY | Admitting: UROLOGY
Payer: MEDICARE

## 2023-06-06 ENCOUNTER — APPOINTMENT (OUTPATIENT)
Facility: HOSPITAL | Age: 63
End: 2023-06-06
Attending: UROLOGY
Payer: MEDICARE

## 2023-06-06 ENCOUNTER — ANESTHESIA (OUTPATIENT)
Facility: HOSPITAL | Age: 63
End: 2023-06-06
Payer: MEDICARE

## 2023-06-06 VITALS
SYSTOLIC BLOOD PRESSURE: 133 MMHG | TEMPERATURE: 98.5 F | OXYGEN SATURATION: 96 % | RESPIRATION RATE: 16 BRPM | HEART RATE: 103 BPM | DIASTOLIC BLOOD PRESSURE: 72 MMHG

## 2023-06-06 PROCEDURE — 7100000000 HC PACU RECOVERY - FIRST 15 MIN: Performed by: UROLOGY

## 2023-06-06 PROCEDURE — 7100000010 HC PHASE II RECOVERY - FIRST 15 MIN: Performed by: UROLOGY

## 2023-06-06 PROCEDURE — 6360000002 HC RX W HCPCS: Performed by: NURSE ANESTHETIST, CERTIFIED REGISTERED

## 2023-06-06 PROCEDURE — 6360000004 HC RX CONTRAST MEDICATION: Performed by: UROLOGY

## 2023-06-06 PROCEDURE — 3700000001 HC ADD 15 MINUTES (ANESTHESIA): Performed by: UROLOGY

## 2023-06-06 PROCEDURE — 7100000011 HC PHASE II RECOVERY - ADDTL 15 MIN: Performed by: UROLOGY

## 2023-06-06 PROCEDURE — 3600000002 HC SURGERY LEVEL 2 BASE: Performed by: UROLOGY

## 2023-06-06 PROCEDURE — 6360000002 HC RX W HCPCS: Performed by: SPECIALIST

## 2023-06-06 PROCEDURE — C1769 GUIDE WIRE: HCPCS | Performed by: UROLOGY

## 2023-06-06 PROCEDURE — 3600000012 HC SURGERY LEVEL 2 ADDTL 15MIN: Performed by: UROLOGY

## 2023-06-06 PROCEDURE — 2580000003 HC RX 258: Performed by: UROLOGY

## 2023-06-06 PROCEDURE — 6360000002 HC RX W HCPCS: Performed by: UROLOGY

## 2023-06-06 PROCEDURE — 2580000003 HC RX 258: Performed by: SPECIALIST

## 2023-06-06 PROCEDURE — 3700000000 HC ANESTHESIA ATTENDED CARE: Performed by: UROLOGY

## 2023-06-06 PROCEDURE — 2500000003 HC RX 250 WO HCPCS: Performed by: NURSE ANESTHETIST, CERTIFIED REGISTERED

## 2023-06-06 PROCEDURE — C1726 CATH, BAL DIL, NON-VASCULAR: HCPCS | Performed by: UROLOGY

## 2023-06-06 PROCEDURE — 74420 UROGRAPHY RTRGR +-KUB: CPT

## 2023-06-06 PROCEDURE — 7100000001 HC PACU RECOVERY - ADDTL 15 MIN: Performed by: UROLOGY

## 2023-06-06 PROCEDURE — 2709999900 HC NON-CHARGEABLE SUPPLY: Performed by: UROLOGY

## 2023-06-06 RX ORDER — FENTANYL CITRATE 50 UG/ML
INJECTION, SOLUTION INTRAMUSCULAR; INTRAVENOUS PRN
Status: DISCONTINUED | OUTPATIENT
Start: 2023-06-06 | End: 2023-06-06 | Stop reason: SDUPTHER

## 2023-06-06 RX ORDER — LIDOCAINE HYDROCHLORIDE 20 MG/ML
INJECTION, SOLUTION EPIDURAL; INFILTRATION; INTRACAUDAL; PERINEURAL PRN
Status: DISCONTINUED | OUTPATIENT
Start: 2023-06-06 | End: 2023-06-06 | Stop reason: SDUPTHER

## 2023-06-06 RX ORDER — SODIUM CHLORIDE 9 MG/ML
INJECTION, SOLUTION INTRAVENOUS CONTINUOUS
Status: DISCONTINUED | OUTPATIENT
Start: 2023-06-06 | End: 2023-06-06 | Stop reason: HOSPADM

## 2023-06-06 RX ORDER — SODIUM CHLORIDE 0.9 % (FLUSH) 0.9 %
5-40 SYRINGE (ML) INJECTION PRN
Status: DISCONTINUED | OUTPATIENT
Start: 2023-06-06 | End: 2023-06-06 | Stop reason: HOSPADM

## 2023-06-06 RX ORDER — SODIUM CHLORIDE 9 MG/ML
INJECTION, SOLUTION INTRAVENOUS PRN
Status: DISCONTINUED | OUTPATIENT
Start: 2023-06-06 | End: 2023-06-06 | Stop reason: HOSPADM

## 2023-06-06 RX ORDER — MIDAZOLAM HYDROCHLORIDE 1 MG/ML
INJECTION INTRAMUSCULAR; INTRAVENOUS PRN
Status: DISCONTINUED | OUTPATIENT
Start: 2023-06-06 | End: 2023-06-06 | Stop reason: SDUPTHER

## 2023-06-06 RX ORDER — FENTANYL CITRATE 50 UG/ML
25 INJECTION, SOLUTION INTRAMUSCULAR; INTRAVENOUS EVERY 5 MIN PRN
Status: DISCONTINUED | OUTPATIENT
Start: 2023-06-06 | End: 2023-06-06 | Stop reason: HOSPADM

## 2023-06-06 RX ORDER — EPHEDRINE SULFATE/0.9% NACL/PF 50 MG/5 ML
SYRINGE (ML) INTRAVENOUS PRN
Status: DISCONTINUED | OUTPATIENT
Start: 2023-06-06 | End: 2023-06-06 | Stop reason: SDUPTHER

## 2023-06-06 RX ORDER — PROPOFOL 10 MG/ML
INJECTION, EMULSION INTRAVENOUS PRN
Status: DISCONTINUED | OUTPATIENT
Start: 2023-06-06 | End: 2023-06-06 | Stop reason: SDUPTHER

## 2023-06-06 RX ORDER — OXYCODONE HYDROCHLORIDE 5 MG/1
5 TABLET ORAL
Status: DISCONTINUED | OUTPATIENT
Start: 2023-06-06 | End: 2023-06-06 | Stop reason: HOSPADM

## 2023-06-06 RX ORDER — SODIUM CHLORIDE 0.9 % (FLUSH) 0.9 %
5-40 SYRINGE (ML) INJECTION EVERY 12 HOURS SCHEDULED
Status: DISCONTINUED | OUTPATIENT
Start: 2023-06-06 | End: 2023-06-06 | Stop reason: HOSPADM

## 2023-06-06 RX ORDER — DIPHENHYDRAMINE HYDROCHLORIDE 50 MG/ML
12.5 INJECTION INTRAMUSCULAR; INTRAVENOUS
Status: DISCONTINUED | OUTPATIENT
Start: 2023-06-06 | End: 2023-06-06 | Stop reason: HOSPADM

## 2023-06-06 RX ORDER — SODIUM CHLORIDE, SODIUM LACTATE, POTASSIUM CHLORIDE, CALCIUM CHLORIDE 600; 310; 30; 20 MG/100ML; MG/100ML; MG/100ML; MG/100ML
INJECTION, SOLUTION INTRAVENOUS CONTINUOUS
Status: DISCONTINUED | OUTPATIENT
Start: 2023-06-06 | End: 2023-06-06 | Stop reason: HOSPADM

## 2023-06-06 RX ORDER — ROCURONIUM BROMIDE 10 MG/ML
INJECTION, SOLUTION INTRAVENOUS PRN
Status: DISCONTINUED | OUTPATIENT
Start: 2023-06-06 | End: 2023-06-06 | Stop reason: SDUPTHER

## 2023-06-06 RX ORDER — DROPERIDOL 2.5 MG/ML
0.62 INJECTION, SOLUTION INTRAMUSCULAR; INTRAVENOUS
Status: DISCONTINUED | OUTPATIENT
Start: 2023-06-06 | End: 2023-06-06 | Stop reason: HOSPADM

## 2023-06-06 RX ORDER — LABETALOL HYDROCHLORIDE 5 MG/ML
10 INJECTION, SOLUTION INTRAVENOUS
Status: DISCONTINUED | OUTPATIENT
Start: 2023-06-06 | End: 2023-06-06 | Stop reason: HOSPADM

## 2023-06-06 RX ORDER — SULFAMETHOXAZOLE AND TRIMETHOPRIM 800; 160 MG/1; MG/1
1 TABLET ORAL 2 TIMES DAILY
Qty: 14 TABLET | Refills: 0 | Status: SHIPPED | OUTPATIENT
Start: 2023-06-06 | End: 2023-06-13

## 2023-06-06 RX ORDER — SODIUM CHLORIDE, SODIUM LACTATE, POTASSIUM CHLORIDE, CALCIUM CHLORIDE 600; 310; 30; 20 MG/100ML; MG/100ML; MG/100ML; MG/100ML
INJECTION, SOLUTION INTRAVENOUS CONTINUOUS
Status: DISCONTINUED | OUTPATIENT
Start: 2023-06-06 | End: 2023-06-06

## 2023-06-06 RX ORDER — ONDANSETRON 2 MG/ML
4 INJECTION INTRAMUSCULAR; INTRAVENOUS
Status: DISCONTINUED | OUTPATIENT
Start: 2023-06-06 | End: 2023-06-06 | Stop reason: HOSPADM

## 2023-06-06 RX ORDER — HYDROMORPHONE HYDROCHLORIDE 1 MG/ML
0.25 INJECTION, SOLUTION INTRAMUSCULAR; INTRAVENOUS; SUBCUTANEOUS EVERY 5 MIN PRN
Status: DISCONTINUED | OUTPATIENT
Start: 2023-06-06 | End: 2023-06-06 | Stop reason: HOSPADM

## 2023-06-06 RX ADMIN — MIDAZOLAM 2 MG: 1 INJECTION INTRAMUSCULAR; INTRAVENOUS at 11:32

## 2023-06-06 RX ADMIN — CEFTRIAXONE SODIUM 2000 MG: 2 INJECTION, POWDER, FOR SOLUTION INTRAMUSCULAR; INTRAVENOUS at 11:24

## 2023-06-06 RX ADMIN — SODIUM CHLORIDE, POTASSIUM CHLORIDE, SODIUM LACTATE AND CALCIUM CHLORIDE: 600; 310; 30; 20 INJECTION, SOLUTION INTRAVENOUS at 12:25

## 2023-06-06 RX ADMIN — FENTANYL CITRATE 100 MCG: 50 INJECTION, SOLUTION INTRAMUSCULAR; INTRAVENOUS at 11:30

## 2023-06-06 RX ADMIN — FENTANYL CITRATE 25 MCG: 50 INJECTION, SOLUTION INTRAMUSCULAR; INTRAVENOUS at 12:22

## 2023-06-06 RX ADMIN — Medication 20 MG: at 11:40

## 2023-06-06 RX ADMIN — SUGAMMADEX 120 MG: 100 INJECTION, SOLUTION INTRAVENOUS at 12:02

## 2023-06-06 RX ADMIN — Medication 10 MG: at 11:38

## 2023-06-06 RX ADMIN — ROCURONIUM BROMIDE 50 MG: 10 INJECTION, SOLUTION INTRAVENOUS at 11:32

## 2023-06-06 RX ADMIN — SODIUM CHLORIDE: 9 INJECTION, SOLUTION INTRAVENOUS at 10:35

## 2023-06-06 RX ADMIN — LIDOCAINE HYDROCHLORIDE 100 MG: 20 INJECTION, SOLUTION EPIDURAL; INFILTRATION; INTRACAUDAL; PERINEURAL at 11:32

## 2023-06-06 RX ADMIN — PROPOFOL 150 MG: 10 INJECTION, EMULSION INTRAVENOUS at 11:32

## 2023-06-06 ASSESSMENT — PAIN SCALES - GENERAL
PAINLEVEL_OUTOF10: 0
PAINLEVEL_OUTOF10: 0
PAINLEVEL_OUTOF10: 6
PAINLEVEL_OUTOF10: 8
PAINLEVEL_OUTOF10: 1
PAINLEVEL_OUTOF10: 1
PAINLEVEL_OUTOF10: 6

## 2023-06-06 ASSESSMENT — PAIN DESCRIPTION - DESCRIPTORS
DESCRIPTORS: ACHING;BURNING
DESCRIPTORS: BURNING

## 2023-06-06 ASSESSMENT — PAIN - FUNCTIONAL ASSESSMENT
PAIN_FUNCTIONAL_ASSESSMENT: ACTIVITIES ARE NOT PREVENTED
PAIN_FUNCTIONAL_ASSESSMENT: 0-10
PAIN_FUNCTIONAL_ASSESSMENT: ACTIVITIES ARE NOT PREVENTED

## 2023-06-06 ASSESSMENT — PAIN DESCRIPTION - PAIN TYPE
TYPE: SURGICAL PAIN
TYPE: SURGICAL PAIN

## 2023-06-06 ASSESSMENT — PAIN DESCRIPTION - LOCATION
LOCATION: PERINEUM
LOCATION: PERINEUM

## 2023-06-06 ASSESSMENT — LIFESTYLE VARIABLES: SMOKING_STATUS: 0

## 2023-06-06 NOTE — OP NOTE
lithotomy position after the scope was removed. The patient was then taken to recovery in stable condition.         Kelvin Tsai MD    Electronically signed by Darwin Werner MD on 6/6/2023 at 12:22 PM

## 2023-06-06 NOTE — DISCHARGE INSTRUCTIONS
following symptoms:  Weight gain of 3 pounds or more overnight or 5 pounds in a week, increased swelling in our hands or feet or shortness of breath while lying flat in bed. Please call your doctor as soon as you notice any of these symptoms; do not wait until your next office visit. The discharge information has been reviewed with the patient and caregiver. The patient and caregiver verbalized understanding. Discharge medications reviewed with the patient and caregiver and appropriate educational materials and side effects teaching were provided.   ___________________________________________________________________________________________________________________________________  Patient armband removed and shredded

## 2023-06-06 NOTE — PERIOP NOTE
TRANSFER - IN REPORT:    Verbal report received from [address] [place] [address],[address], CRNA and HARLEY Myers on Star Hubbard  being received from OR for routine progression of patient care      Report consisted of patient's Situation, Background, Assessment and   Recommendations(SBAR). Information from the following report(s) Nurse Handoff Report, Surgery Report, Intake/Output, and MAR was reviewed with the receiving nurse. Opportunity for questions and clarification was provided. Assessment completed upon patient's arrival to unit and care assumed.

## 2023-06-06 NOTE — ANESTHESIA PRE PROCEDURE
06/01/2023 12:50 PM    CALCIUM 9.6 06/01/2023 12:50 PM       POC Tests: No results for input(s): POCGLU, POCNA, POCK, POCCL, POCBUN, POCHEMO, POCHCT in the last 72 hours. Coags: No results found for: PROTIME, INR, APTT    HCG (If Applicable): No results found for: PREGTESTUR, PREGSERUM, HCG, HCGQUANT     ABGs: No results found for: PHART, PO2ART, EHD8NOB, FYD0YZC, BEART, X4EETUJN     Type & Screen (If Applicable):  No results found for: LABABO, LABRH    Drug/Infectious Status (If Applicable):  No results found for: HIV, HEPCAB    COVID-19 Screening (If Applicable):   Lab Results   Component Value Date/Time    COVID19 Not Detected 03/22/2021 06:35 PM           Anesthesia Evaluation  Patient summary reviewed and Nursing notes reviewed   history of anesthetic complications: PONV. Airway: Mallampati: II  TM distance: >3 FB   Neck ROM: full  Mouth opening: > = 3 FB   Dental: normal exam         Pulmonary:Negative Pulmonary ROS breath sounds clear to auscultation      (-) sleep apnea and not a current smoker                           Cardiovascular:  Exercise tolerance: poor (<4 METS),   (+) hypertension: moderate,       ECG reviewed  Rhythm: regular  Rate: normal           Beta Blocker:  Not on Beta Blocker         Neuro/Psych:                ROS comment: Cerebral palsy GI/Hepatic/Renal:   (+) GERD: poorly controlled,      (-) hiatal hernia       Endo/Other: Negative Endo/Other ROS                    Abdominal: normal exam            Vascular: Other Findings:           Anesthesia Plan      general     ASA 2             Anesthetic plan and risks discussed with patient.                         Garrison Claude, MD   6/6/2023

## 2023-06-06 NOTE — ANESTHESIA POSTPROCEDURE EVALUATION
Department of Anesthesiology  Postprocedure Note    Patient: Paula Bishop  MRN: [id number]  YOB: 1960  Date of evaluation: 6/6/2023      Procedure Summary     Date: 06/06/23 Room / Location: [address] MAIN OR    Anesthesia Start: 1124 Anesthesia Stop: 2721    Procedure: CYSTOSCOPY URETHRAL DILATATION WITH C-ARM \"SPEC POP\" (Urethra) Diagnosis:       Other stricture of urethra in female      (URETHRAL STRICTURE)    Surgeons: Celeste Hinojosa MD Responsible Provider: Yoana Collins MD    Anesthesia Type: general ASA Status: 2          Anesthesia Type: No value filed.     Yesica Phase I: Yesica Score: 10    Yesica Phase II: Yesica Score: 9      Anesthesia Post Evaluation    Patient location during evaluation: PACU  Patient participation: complete - patient participated  Level of consciousness: awake and alert  Pain score: 0  Airway patency: patent  Nausea & Vomiting: no nausea and no vomiting  Complications: no  Cardiovascular status: blood pressure returned to baseline  Respiratory status: acceptable  Hydration status: euvolemic

## 2023-06-06 NOTE — PERIOP NOTE
Reviewed PTA medication list with patient/caregiver and patient/caregiver denies any additional medications. Patient admits to having a responsible adult care for them at home for at least 24 hours after surgery. Patient encouraged to use gown warming system and informed that using said warming gown to regulate body temperature prior to a procedure has been shown to help reduce the risks of blood clots and infection. Patient's pharmacy of choice verified and documented in PTA medication section. Dual skin assessment & fall risk band verification completed with Gilma Calles.

## 2023-06-06 NOTE — H&P
[address]rology [address] [address]  Tel: [phone]  Fax: [phone]    Patient : Tayla Green   YOB: 1960                   Assessment/Plan  # Detail Type Description    1. Assessment Other urethral stricture, female (N35.82). Patient Plan She has a hx of a stricture. She is doing worse and having a much more difficult time getting his stream started in emptying. Unfortunately, she has an upcoming surgery and we worried that this may put her into retention postoperatively. We will do a cystoscopy and urethral dilation. Risks of bleeding and infection and possible need for future procedures were discussed. 2. Assessment Urinary tract infection, site not specified (N39.0). Patient Plan Her urine is totally clear today    Plan Orders The patient had the following order(s) completed today: UA In Office No Micro. Obtained on 05/31/2023, Interpretation: See module. 3. Assessment Feeling of incomplete bladder emptying (R39.14). Patient Plan          4. Assessment Urgency of urination (R39.15). Patient Plan She's doing fine. 5. Assessment Immobility (Z74.09). Patient Plan She has issues with immobility. It's particularly difficult to get on to a toilet when she's traveling but she does fine at home. Additional Visit Information      This 63year old female presents for Urinary Retention. History of Present Illness  1. Urinary Retention   Onset was 8 years ago. Severity level is no pain. It occurs daily. The problem is improving. Associated symptoms include incomplete emptying, nocturia, pelvic pressure, slow stream, urgency, dribbling and urinary frequency. Pertinent negatives include chills, constipation and fever. Additional information: BM's QOD -- that are huge and hard like a cucumber.    she leaks with what is probably stress manuevers  -- She has been dilated every 2-4 years in the operating

## 2023-06-06 NOTE — PERIOP NOTE
TRANSFER - OUT REPORT:    Verbal report given to Precious Garrett RN on Catracho Glass  being transferred to Phase 2 for routine progression of patient care       Report consisted of patient's Situation, Background, Assessment and   Recommendations(SBAR). Information from the following report(s) Nurse Handoff Report, Surgery Report, Intake/Output, and MAR was reviewed with the receiving nurse. [place] Assessment: No data recorded  Lines:   Peripheral IV 06/06/23 Right Forearm (Active)   Site Assessment Clean, dry & intact 06/06/23 1245   Line Status Infusing 06/06/23 [address] Connections checked and tightened 06/06/23 1245   Phlebitis Assessment No symptoms 06/06/23 1245   Infiltration Assessment 0 06/06/23 1245   Alcohol Cap Used No 06/06/23 1245   Dressing Status Clean, dry & intact 06/06/23 1245   Dressing Type Transparent 06/06/23 1245        Intake/Output Summary (Last 24 hours) at 6/6/2023 1249  Last data filed at 6/6/2023 1240  Gross per 24 hour   Intake 200 ml   Output --   Net 200 ml     BP (!) 146/75   Pulse 94   Temp 98.8 °F (37.1 °C) (Temporal)   Resp 20   SpO2 91%     Opportunity for questions and clarification was provided.       Patient transported with:  [email or web address]

## 2023-06-06 NOTE — PERIOP NOTE
TRANSFER - IN REPORT:    Verbal report received from 20117Everton Carrasco rn on [facility]  being received from [place] for routine post-op      Report consisted of patient's Situation, Background, Assessment and   Recommendations(SBAR). Information from the following report(s) Nurse Handoff Report was reviewed with the receiving nurse. Opportunity for questions and clarification was provided. Assessment completed upon patient's arrival to unit and care assumed.

## 2023-06-06 NOTE — PERIOP NOTE
Patient states pain level of 6 but also states she \"doesn't want pain medicine vecause it's not that bad\".

## 2024-04-11 ENCOUNTER — APPOINTMENT (OUTPATIENT)
Facility: HOSPITAL | Age: 64
DRG: 092 | End: 2024-04-11
Payer: MEDICARE

## 2024-04-11 ENCOUNTER — HOSPITAL ENCOUNTER (INPATIENT)
Facility: HOSPITAL | Age: 64
LOS: 5 days | Discharge: NURSING, REHAB OR LONG-TERM CARE | DRG: 092 | End: 2024-04-16
Attending: HOSPITALIST | Admitting: HOSPITALIST
Payer: MEDICARE

## 2024-04-11 DIAGNOSIS — M79.605 LEFT LEG PAIN: ICD-10-CM

## 2024-04-11 DIAGNOSIS — Q65.89 HIP DYSPLASIA: ICD-10-CM

## 2024-04-11 DIAGNOSIS — Z78.9 UNABLE TO CARE FOR SELF: Primary | ICD-10-CM

## 2024-04-11 DIAGNOSIS — R26.9 ALTERED GAIT: ICD-10-CM

## 2024-04-11 DIAGNOSIS — R26.2 AMBULATORY DYSFUNCTION: ICD-10-CM

## 2024-04-11 DIAGNOSIS — G80.9 CEREBRAL PALSY, UNSPECIFIED TYPE (HCC): ICD-10-CM

## 2024-04-11 PROBLEM — I10 HTN (HYPERTENSION): Status: ACTIVE | Noted: 2024-04-11

## 2024-04-11 PROBLEM — K21.9 GERD (GASTROESOPHAGEAL REFLUX DISEASE): Status: ACTIVE | Noted: 2024-04-11

## 2024-04-11 LAB
ALBUMIN SERPL-MCNC: 3.7 G/DL (ref 3.4–5)
ALBUMIN/GLOB SERPL: 0.9 (ref 0.8–1.7)
ALP SERPL-CCNC: 116 U/L (ref 45–117)
ALT SERPL-CCNC: 32 U/L (ref 13–56)
ANION GAP SERPL CALC-SCNC: 6 MMOL/L (ref 3–18)
AST SERPL-CCNC: 44 U/L (ref 10–38)
BASOPHILS # BLD: 0.1 K/UL (ref 0–0.1)
BASOPHILS NFR BLD: 1 % (ref 0–2)
BILIRUB SERPL-MCNC: 0.7 MG/DL (ref 0.2–1)
BUN SERPL-MCNC: 13 MG/DL (ref 7–18)
BUN/CREAT SERPL: 22 (ref 12–20)
CALCIUM SERPL-MCNC: 9.6 MG/DL (ref 8.5–10.1)
CHLORIDE SERPL-SCNC: 105 MMOL/L (ref 100–111)
CO2 SERPL-SCNC: 28 MMOL/L (ref 21–32)
CREAT SERPL-MCNC: 0.6 MG/DL (ref 0.6–1.3)
DIFFERENTIAL METHOD BLD: ABNORMAL
EOSINOPHIL # BLD: 0 K/UL (ref 0–0.4)
EOSINOPHIL NFR BLD: 0 % (ref 0–5)
ERYTHROCYTE [DISTWIDTH] IN BLOOD BY AUTOMATED COUNT: 12.8 % (ref 11.6–14.5)
GLOBULIN SER CALC-MCNC: 3.9 G/DL (ref 2–4)
GLUCOSE SERPL-MCNC: 95 MG/DL (ref 74–99)
HCT VFR BLD AUTO: 37.7 % (ref 35–45)
HGB BLD-MCNC: 12.4 G/DL (ref 12–16)
IMM GRANULOCYTES # BLD AUTO: 0 K/UL (ref 0–0.04)
IMM GRANULOCYTES NFR BLD AUTO: 0 % (ref 0–0.5)
LYMPHOCYTES # BLD: 1.3 K/UL (ref 0.9–3.6)
LYMPHOCYTES NFR BLD: 13 % (ref 21–52)
MCH RBC QN AUTO: 30.4 PG (ref 24–34)
MCHC RBC AUTO-ENTMCNC: 32.9 G/DL (ref 31–37)
MCV RBC AUTO: 92.4 FL (ref 78–100)
MONOCYTES # BLD: 1.1 K/UL (ref 0.05–1.2)
MONOCYTES NFR BLD: 11 % (ref 3–10)
NEUTS SEG # BLD: 7.7 K/UL (ref 1.8–8)
NEUTS SEG NFR BLD: 75 % (ref 40–73)
NRBC # BLD: 0 K/UL (ref 0–0.01)
NRBC BLD-RTO: 0 PER 100 WBC
PLATELET # BLD AUTO: 247 K/UL (ref 135–420)
PMV BLD AUTO: 10.3 FL (ref 9.2–11.8)
POTASSIUM SERPL-SCNC: 4.2 MMOL/L (ref 3.5–5.5)
PROT SERPL-MCNC: 7.6 G/DL (ref 6.4–8.2)
RBC # BLD AUTO: 4.08 M/UL (ref 4.2–5.3)
SODIUM SERPL-SCNC: 139 MMOL/L (ref 136–145)
WBC # BLD AUTO: 10.2 K/UL (ref 4.6–13.2)

## 2024-04-11 PROCEDURE — 73562 X-RAY EXAM OF KNEE 3: CPT

## 2024-04-11 PROCEDURE — 80053 COMPREHEN METABOLIC PANEL: CPT

## 2024-04-11 PROCEDURE — 73502 X-RAY EXAM HIP UNI 2-3 VIEWS: CPT

## 2024-04-11 PROCEDURE — 6360000002 HC RX W HCPCS: Performed by: HOSPITALIST

## 2024-04-11 PROCEDURE — 6370000000 HC RX 637 (ALT 250 FOR IP): Performed by: HOSPITALIST

## 2024-04-11 PROCEDURE — 73560 X-RAY EXAM OF KNEE 1 OR 2: CPT

## 2024-04-11 PROCEDURE — 85025 COMPLETE CBC W/AUTO DIFF WBC: CPT

## 2024-04-11 PROCEDURE — 2580000003 HC RX 258: Performed by: HOSPITALIST

## 2024-04-11 PROCEDURE — 99285 EMERGENCY DEPT VISIT HI MDM: CPT

## 2024-04-11 PROCEDURE — 93971 EXTREMITY STUDY: CPT

## 2024-04-11 PROCEDURE — 1100000000 HC RM PRIVATE

## 2024-04-11 RX ORDER — LISINOPRIL 20 MG/1
40 TABLET ORAL DAILY
Status: DISCONTINUED | OUTPATIENT
Start: 2024-04-11 | End: 2024-04-13

## 2024-04-11 RX ORDER — ONDANSETRON 4 MG/1
4 TABLET, ORALLY DISINTEGRATING ORAL EVERY 8 HOURS PRN
Status: DISCONTINUED | OUTPATIENT
Start: 2024-04-11 | End: 2024-04-16 | Stop reason: HOSPADM

## 2024-04-11 RX ORDER — ENOXAPARIN SODIUM 100 MG/ML
40 INJECTION SUBCUTANEOUS DAILY
Status: DISCONTINUED | OUTPATIENT
Start: 2024-04-11 | End: 2024-04-16 | Stop reason: HOSPADM

## 2024-04-11 RX ORDER — PANTOPRAZOLE SODIUM 40 MG/1
40 TABLET, DELAYED RELEASE ORAL
Status: DISCONTINUED | OUTPATIENT
Start: 2024-04-12 | End: 2024-04-16 | Stop reason: HOSPADM

## 2024-04-11 RX ORDER — AMITRIPTYLINE HYDROCHLORIDE 10 MG/1
10 TABLET, FILM COATED ORAL DAILY
Status: DISCONTINUED | OUTPATIENT
Start: 2024-04-11 | End: 2024-04-16 | Stop reason: HOSPADM

## 2024-04-11 RX ORDER — ONDANSETRON 2 MG/ML
4 INJECTION INTRAMUSCULAR; INTRAVENOUS EVERY 6 HOURS PRN
Status: DISCONTINUED | OUTPATIENT
Start: 2024-04-11 | End: 2024-04-16 | Stop reason: HOSPADM

## 2024-04-11 RX ORDER — LORAZEPAM 0.5 MG/1
0.5 TABLET ORAL EVERY 12 HOURS PRN
Status: DISCONTINUED | OUTPATIENT
Start: 2024-04-11 | End: 2024-04-16 | Stop reason: HOSPADM

## 2024-04-11 RX ORDER — SODIUM CHLORIDE 0.9 % (FLUSH) 0.9 %
5-40 SYRINGE (ML) INJECTION EVERY 12 HOURS SCHEDULED
Status: DISCONTINUED | OUTPATIENT
Start: 2024-04-11 | End: 2024-04-16 | Stop reason: HOSPADM

## 2024-04-11 RX ORDER — SODIUM CHLORIDE 0.9 % (FLUSH) 0.9 %
5-40 SYRINGE (ML) INJECTION PRN
Status: DISCONTINUED | OUTPATIENT
Start: 2024-04-11 | End: 2024-04-16 | Stop reason: HOSPADM

## 2024-04-11 RX ORDER — POLYETHYLENE GLYCOL 3350 17 G/17G
17 POWDER, FOR SOLUTION ORAL DAILY PRN
Status: DISCONTINUED | OUTPATIENT
Start: 2024-04-11 | End: 2024-04-16 | Stop reason: HOSPADM

## 2024-04-11 RX ORDER — GABAPENTIN 300 MG/1
300 CAPSULE ORAL
Status: DISCONTINUED | OUTPATIENT
Start: 2024-04-11 | End: 2024-04-16 | Stop reason: HOSPADM

## 2024-04-11 RX ORDER — ACETAMINOPHEN 325 MG/1
650 TABLET ORAL EVERY 6 HOURS PRN
Status: DISCONTINUED | OUTPATIENT
Start: 2024-04-11 | End: 2024-04-16 | Stop reason: HOSPADM

## 2024-04-11 RX ORDER — SODIUM CHLORIDE 9 MG/ML
INJECTION, SOLUTION INTRAVENOUS PRN
Status: DISCONTINUED | OUTPATIENT
Start: 2024-04-11 | End: 2024-04-16 | Stop reason: HOSPADM

## 2024-04-11 RX ORDER — ACETAMINOPHEN 650 MG/1
650 SUPPOSITORY RECTAL EVERY 6 HOURS PRN
Status: DISCONTINUED | OUTPATIENT
Start: 2024-04-11 | End: 2024-04-16 | Stop reason: HOSPADM

## 2024-04-11 RX ADMIN — GABAPENTIN 300 MG: 300 CAPSULE ORAL at 21:58

## 2024-04-11 RX ADMIN — SODIUM CHLORIDE, PRESERVATIVE FREE 10 ML: 5 INJECTION INTRAVENOUS at 22:00

## 2024-04-11 RX ADMIN — ENOXAPARIN SODIUM 40 MG: 100 INJECTION SUBCUTANEOUS at 22:11

## 2024-04-11 RX ADMIN — LISINOPRIL 40 MG: 20 TABLET ORAL at 21:58

## 2024-04-11 ASSESSMENT — PAIN DESCRIPTION - LOCATION: LOCATION: LEG

## 2024-04-11 ASSESSMENT — PAIN DESCRIPTION - ORIENTATION: ORIENTATION: LEFT

## 2024-04-11 ASSESSMENT — PAIN DESCRIPTION - DESCRIPTORS: DESCRIPTORS: SHARP

## 2024-04-11 ASSESSMENT — PAIN - FUNCTIONAL ASSESSMENT: PAIN_FUNCTIONAL_ASSESSMENT: 0-10

## 2024-04-11 ASSESSMENT — PAIN SCALES - GENERAL: PAINLEVEL_OUTOF10: 7

## 2024-04-11 NOTE — ED NOTES
Attempted to call report to 3S. Was told receiving nurse was busy. This nurse verbalized the pt had already had a bed for 1 hour. Will call back.

## 2024-04-11 NOTE — ED PROVIDER NOTES
tablet 0.5 mg  0.5 mg Oral Q12H PRN Akilah Hutchison MD              PHYSICAL EXAM      Vitals:    04/11/24 1330 04/11/24 1458 04/11/24 2130 04/12/24 0026   BP: 126/62 136/65 (!) 145/88    Pulse:   85    Resp:   16    Temp:   97.8 °F (36.6 °C)    TempSrc:   Oral    SpO2:  97% 98%    Weight:    71.5 kg (157 lb 10.1 oz)   Height:         Physical Exam  Vitals and nursing note reviewed.   Constitutional:       General: She is not in acute distress.     Appearance: Normal appearance. She is not ill-appearing.   HENT:      Head: Normocephalic and atraumatic.      Nose: Nose normal.      Mouth/Throat:      Mouth: Mucous membranes are moist.      Pharynx: Oropharynx is clear.   Eyes:      Extraocular Movements: Extraocular movements intact.      Pupils: Pupils are equal, round, and reactive to light.   Cardiovascular:      Rate and Rhythm: Normal rate and regular rhythm.      Pulses: Normal pulses.      Heart sounds: Normal heart sounds.   Pulmonary:      Effort: Pulmonary effort is normal. No respiratory distress.      Breath sounds: Normal breath sounds.   Abdominal:      General: Abdomen is flat. Bowel sounds are normal.      Palpations: Abdomen is soft.      Tenderness: There is no abdominal tenderness.   Musculoskeletal:         General: Normal range of motion.      Cervical back: Normal range of motion and neck supple.      Comments: Left leg is chronically shortened.  There is no range of motion at the knee joints bilaterally.  The pelvis is stable and there is no significant bony tenderness.   Skin:     General: Skin is warm and dry.      Capillary Refill: Capillary refill takes less than 2 seconds.   Neurological:      General: No focal deficit present.      Mental Status: She is alert and oriented to person, place, and time.      Cranial Nerves: Cranial nerves 2-12 are intact.      Comments: History of cerebral palsy, motor function is at baseline   Psychiatric:         Mood and Affect: Mood normal.          DIAGNOSTIC

## 2024-04-11 NOTE — ED NOTES
TRANSFER - OUT REPORT:    Verbal report given to Sandra RN on Desire Florez  being transferred to  for routine progression of patient care       Report consisted of patient's Situation, Background, Assessment and   Recommendations(SBAR).     Information from the following report(s) Nurse Handoff Report, Index, ED Encounter Summary, ED SBAR, Adult Overview, MAR, and Recent Results was reviewed with the receiving nurse.    [place] Fall Assessment:                           Lines:       Opportunity for questions and clarification was provided.      Patient transported with:  [employer]

## 2024-04-11 NOTE — ED TRIAGE NOTES
Pt BIBA c/o left knee pain following injury today. Per EMS, pt had surgery on right ankle a few weeks ago and was in rehab but just got released Sunday. Per EMS, pt was trying to use bedside commode at home and put too much weight on good leg and fell. Pt denies LOC or any other injury at this time. Pt has hx of cerebral palsy.

## 2024-04-12 ENCOUNTER — APPOINTMENT (OUTPATIENT)
Facility: HOSPITAL | Age: 64
DRG: 092 | End: 2024-04-12
Attending: HOSPITALIST
Payer: MEDICARE

## 2024-04-12 LAB
ANION GAP SERPL CALC-SCNC: 7 MMOL/L (ref 3–18)
APPEARANCE UR: CLEAR
BACTERIA URNS QL MICRO: ABNORMAL /HPF
BASOPHILS # BLD: 0.1 K/UL (ref 0–0.1)
BASOPHILS NFR BLD: 1 % (ref 0–2)
BILIRUB UR QL: NEGATIVE
BUN SERPL-MCNC: 11 MG/DL (ref 7–18)
BUN/CREAT SERPL: 20 (ref 12–20)
CALCIUM SERPL-MCNC: 9.2 MG/DL (ref 8.5–10.1)
CHLORIDE SERPL-SCNC: 107 MMOL/L (ref 100–111)
CO2 SERPL-SCNC: 26 MMOL/L (ref 21–32)
COLOR UR: YELLOW
CREAT SERPL-MCNC: 0.55 MG/DL (ref 0.6–1.3)
DIFFERENTIAL METHOD BLD: ABNORMAL
ECHO BSA: 1.66 M2
ECHO BSA: 1.66 M2
EOSINOPHIL # BLD: 0.3 K/UL (ref 0–0.4)
EOSINOPHIL NFR BLD: 3 % (ref 0–5)
EPITH CASTS URNS QL MICRO: NEGATIVE /LPF (ref 0–5)
ERYTHROCYTE [DISTWIDTH] IN BLOOD BY AUTOMATED COUNT: 12.8 % (ref 11.6–14.5)
GLUCOSE SERPL-MCNC: 103 MG/DL (ref 74–99)
GLUCOSE UR STRIP.AUTO-MCNC: NEGATIVE MG/DL
HCT VFR BLD AUTO: 35.6 % (ref 35–45)
HGB BLD-MCNC: 11.6 G/DL (ref 12–16)
HGB UR QL STRIP: ABNORMAL
IMM GRANULOCYTES # BLD AUTO: 0 K/UL (ref 0–0.04)
IMM GRANULOCYTES NFR BLD AUTO: 0 % (ref 0–0.5)
KETONES UR QL STRIP.AUTO: ABNORMAL MG/DL
LEUKOCYTE ESTERASE UR QL STRIP.AUTO: ABNORMAL
LYMPHOCYTES # BLD: 2.2 K/UL (ref 0.9–3.6)
LYMPHOCYTES NFR BLD: 25 % (ref 21–52)
MAGNESIUM SERPL-MCNC: 1.9 MG/DL (ref 1.6–2.6)
MCH RBC QN AUTO: 30.5 PG (ref 24–34)
MCHC RBC AUTO-ENTMCNC: 32.6 G/DL (ref 31–37)
MCV RBC AUTO: 93.7 FL (ref 78–100)
MONOCYTES # BLD: 1 K/UL (ref 0.05–1.2)
MONOCYTES NFR BLD: 12 % (ref 3–10)
NEUTS SEG # BLD: 5 K/UL (ref 1.8–8)
NEUTS SEG NFR BLD: 58 % (ref 40–73)
NITRITE UR QL STRIP.AUTO: POSITIVE
NRBC # BLD: 0 K/UL (ref 0–0.01)
NRBC BLD-RTO: 0 PER 100 WBC
PH UR STRIP: 5.5 (ref 5–8)
PLATELET # BLD AUTO: 242 K/UL (ref 135–420)
PMV BLD AUTO: 10.6 FL (ref 9.2–11.8)
POTASSIUM SERPL-SCNC: 3 MMOL/L (ref 3.5–5.5)
PROT UR STRIP-MCNC: NEGATIVE MG/DL
RBC # BLD AUTO: 3.8 M/UL (ref 4.2–5.3)
RBC #/AREA URNS HPF: NEGATIVE /HPF (ref 0–5)
SODIUM SERPL-SCNC: 140 MMOL/L (ref 136–145)
SP GR UR REFRACTOMETRY: 1.01 (ref 1–1.03)
UROBILINOGEN UR QL STRIP.AUTO: 1 EU/DL (ref 0.2–1)
WBC # BLD AUTO: 8.6 K/UL (ref 4.6–13.2)
WBC URNS QL MICRO: ABNORMAL /HPF (ref 0–5)

## 2024-04-12 PROCEDURE — 87186 SC STD MICRODIL/AGAR DIL: CPT

## 2024-04-12 PROCEDURE — 1100000000 HC RM PRIVATE

## 2024-04-12 PROCEDURE — 36415 COLL VENOUS BLD VENIPUNCTURE: CPT

## 2024-04-12 PROCEDURE — 80048 BASIC METABOLIC PNL TOTAL CA: CPT

## 2024-04-12 PROCEDURE — 6370000000 HC RX 637 (ALT 250 FOR IP): Performed by: INTERNAL MEDICINE

## 2024-04-12 PROCEDURE — 6360000002 HC RX W HCPCS: Performed by: INTERNAL MEDICINE

## 2024-04-12 PROCEDURE — 92610 EVALUATE SWALLOWING FUNCTION: CPT

## 2024-04-12 PROCEDURE — 85025 COMPLETE CBC W/AUTO DIFF WBC: CPT

## 2024-04-12 PROCEDURE — 97163 PT EVAL HIGH COMPLEX 45 MIN: CPT

## 2024-04-12 PROCEDURE — 97530 THERAPEUTIC ACTIVITIES: CPT

## 2024-04-12 PROCEDURE — 6370000000 HC RX 637 (ALT 250 FOR IP): Performed by: HOSPITALIST

## 2024-04-12 PROCEDURE — 97112 NEUROMUSCULAR REEDUCATION: CPT

## 2024-04-12 PROCEDURE — 93971 EXTREMITY STUDY: CPT

## 2024-04-12 PROCEDURE — 2580000003 HC RX 258: Performed by: HOSPITALIST

## 2024-04-12 PROCEDURE — 2580000003 HC RX 258: Performed by: INTERNAL MEDICINE

## 2024-04-12 PROCEDURE — 6360000002 HC RX W HCPCS: Performed by: HOSPITALIST

## 2024-04-12 PROCEDURE — 97110 THERAPEUTIC EXERCISES: CPT

## 2024-04-12 PROCEDURE — 81001 URINALYSIS AUTO W/SCOPE: CPT

## 2024-04-12 PROCEDURE — 87086 URINE CULTURE/COLONY COUNT: CPT

## 2024-04-12 PROCEDURE — 97535 SELF CARE MNGMENT TRAINING: CPT

## 2024-04-12 PROCEDURE — 83735 ASSAY OF MAGNESIUM: CPT

## 2024-04-12 PROCEDURE — 87088 URINE BACTERIA CULTURE: CPT

## 2024-04-12 PROCEDURE — 97167 OT EVAL HIGH COMPLEX 60 MIN: CPT

## 2024-04-12 RX ORDER — POLYETHYLENE GLYCOL 3350 17 G/17G
17 POWDER, FOR SOLUTION ORAL DAILY
Status: DISCONTINUED | OUTPATIENT
Start: 2024-04-12 | End: 2024-04-16 | Stop reason: HOSPADM

## 2024-04-12 RX ORDER — LACTOBACILLUS RHAMNOSUS GG 10B CELL
1 CAPSULE ORAL DAILY
Status: DISCONTINUED | OUTPATIENT
Start: 2024-04-12 | End: 2024-04-16 | Stop reason: HOSPADM

## 2024-04-12 RX ORDER — POTASSIUM CHLORIDE 20 MEQ/1
40 TABLET, EXTENDED RELEASE ORAL ONCE
Status: DISCONTINUED | OUTPATIENT
Start: 2024-04-12 | End: 2024-04-12 | Stop reason: SDUPTHER

## 2024-04-12 RX ORDER — POTASSIUM CHLORIDE 20 MEQ/1
40 TABLET, EXTENDED RELEASE ORAL ONCE
Status: COMPLETED | OUTPATIENT
Start: 2024-04-12 | End: 2024-04-12

## 2024-04-12 RX ADMIN — AMITRIPTYLINE HYDROCHLORIDE 10 MG: 10 TABLET, FILM COATED ORAL at 10:00

## 2024-04-12 RX ADMIN — PIPERACILLIN AND TAZOBACTAM 3375 MG: 3; .375 INJECTION, POWDER, LYOPHILIZED, FOR SOLUTION INTRAVENOUS at 13:14

## 2024-04-12 RX ADMIN — ENOXAPARIN SODIUM 40 MG: 100 INJECTION SUBCUTANEOUS at 09:59

## 2024-04-12 RX ADMIN — PANTOPRAZOLE SODIUM 40 MG: 40 TABLET, DELAYED RELEASE ORAL at 05:37

## 2024-04-12 RX ADMIN — Medication 1 CAPSULE: at 09:59

## 2024-04-12 RX ADMIN — POTASSIUM CHLORIDE 40 MEQ: 1500 TABLET, EXTENDED RELEASE ORAL at 06:42

## 2024-04-12 RX ADMIN — ACETAMINOPHEN 650 MG: 325 TABLET ORAL at 01:16

## 2024-04-12 RX ADMIN — POTASSIUM BICARBONATE 40 MEQ: 782 TABLET, EFFERVESCENT ORAL at 13:15

## 2024-04-12 RX ADMIN — DOCUSATE SODIUM 100 MG: 50 LIQUID ORAL at 10:00

## 2024-04-12 RX ADMIN — PIPERACILLIN AND TAZOBACTAM 3375 MG: 3; .375 INJECTION, POWDER, LYOPHILIZED, FOR SOLUTION INTRAVENOUS at 18:56

## 2024-04-12 RX ADMIN — GABAPENTIN 300 MG: 300 CAPSULE ORAL at 20:50

## 2024-04-12 RX ADMIN — SODIUM CHLORIDE, PRESERVATIVE FREE 10 ML: 5 INJECTION INTRAVENOUS at 10:01

## 2024-04-12 RX ADMIN — SODIUM CHLORIDE, PRESERVATIVE FREE 10 ML: 5 INJECTION INTRAVENOUS at 20:51

## 2024-04-12 ASSESSMENT — PAIN DESCRIPTION - DESCRIPTORS
DESCRIPTORS: ACHING;BURNING
DESCRIPTORS: ACHING

## 2024-04-12 ASSESSMENT — PAIN DESCRIPTION - ORIENTATION
ORIENTATION: RIGHT;LEFT
ORIENTATION: RIGHT;LEFT

## 2024-04-12 ASSESSMENT — PAIN DESCRIPTION - LOCATION
LOCATION: FOOT
LOCATION: FOOT

## 2024-04-12 ASSESSMENT — PAIN - FUNCTIONAL ASSESSMENT
PAIN_FUNCTIONAL_ASSESSMENT: PREVENTS OR INTERFERES SOME ACTIVE ACTIVITIES AND ADLS
PAIN_FUNCTIONAL_ASSESSMENT: PREVENTS OR INTERFERES SOME ACTIVE ACTIVITIES AND ADLS

## 2024-04-12 ASSESSMENT — PAIN SCALES - GENERAL
PAINLEVEL_OUTOF10: 10
PAINLEVEL_OUTOF10: 5

## 2024-04-12 ASSESSMENT — PAIN DESCRIPTION - FREQUENCY: FREQUENCY: INTERMITTENT

## 2024-04-12 NOTE — CARE COORDINATION
04/12/24 1724   Service Assessment   Patient Orientation Alert and Oriented;Person;Place;Situation;Self   Cognition Alert   History Provided By Patient;Child/Family   Primary Caregiver Family   Support Systems Parent   Patient's Healthcare Decision Maker is: Legal Next of Kin   PCP Verified by CM Yes  (Patient verified her PCP as Al Nielsen)   Last Visit to PCP Within last two years   Prior Functional Level Assistance with the following:;Dressing;Bathing;Toileting;Housework;Shopping;Mobility;Cooking   Current Functional Level Assistance with the following:;Bathing;Dressing;Toileting;Cooking;Housework;Shopping;Mobility   Can patient return to prior living arrangement Unknown at present   Ability to make needs known: Good   Family able to assist with home care needs: Other (comment)   Would you like for me to discuss the discharge plan with any other family members/significant others, and if so, who? Yes  (Father - Mitch Florez # [phone])   Financial Resources Medicare  ([employer] Supplement)   Community Resources Other (Comment)  ([facility] on A[facility])   CM/SW Referral Financial   Social/Functional History   Lives With Parent   Type of Home House   Home Layout Two level;Performs ADL's on one level   Active  No   Occupation On disability   Discharge Planning   Type of Residence House   Living Arrangements Family Members   Current Services Prior To Admission Durable Medical Equipment   Current DME Prior to Arrival Wheelchair;Other (Comment)  (porch left, roll in shower)   Potential Assistance Needed Skilled Nursing Facility;Other (Comment)  (Long-term care)   Potential Assistance Purchasing Medications No   Type of Home Care Services OT;PT;Nursing Services  (Patient was scheduled to start  services with [facility].)   Patient expects to be discharged to: Skilled nursing facility   Follow Up Appointment: Best Day/Time    (unknown at this time)   One/Two Story Residence One story

## 2024-04-12 NOTE — PLAN OF CARE
Problem: Pain  Goal: Verbalizes/displays adequate comfort level or baseline comfort level  Outcome: Progressing     Problem: Skin/Tissue Integrity  Goal: Absence of new skin breakdown   Monitor for areas of redness and/or skin breakdown  Outcome: Progressing     Problem: ABCDS Injury Assessment  Goal: Absence of physical injury  Outcome: Progressing     Problem: Safety - Adult  Goal: Free from fall injury  Outcome: Progressing

## 2024-04-13 LAB
ANION GAP SERPL CALC-SCNC: 6 MMOL/L (ref 3–18)
BASOPHILS # BLD: 0.1 K/UL (ref 0–0.1)
BASOPHILS NFR BLD: 2 % (ref 0–2)
BUN SERPL-MCNC: 11 MG/DL (ref 7–18)
BUN/CREAT SERPL: 19 (ref 12–20)
CALCIUM SERPL-MCNC: 8.6 MG/DL (ref 8.5–10.1)
CHLORIDE SERPL-SCNC: 108 MMOL/L (ref 100–111)
CO2 SERPL-SCNC: 27 MMOL/L (ref 21–32)
CREAT SERPL-MCNC: 0.57 MG/DL (ref 0.6–1.3)
DIFFERENTIAL METHOD BLD: ABNORMAL
EOSINOPHIL # BLD: 0.4 K/UL (ref 0–0.4)
EOSINOPHIL NFR BLD: 6 % (ref 0–5)
ERYTHROCYTE [DISTWIDTH] IN BLOOD BY AUTOMATED COUNT: 12.8 % (ref 11.6–14.5)
GLUCOSE SERPL-MCNC: 102 MG/DL (ref 74–99)
HCT VFR BLD AUTO: 34.8 % (ref 35–45)
HGB BLD-MCNC: 11 G/DL (ref 12–16)
IMM GRANULOCYTES # BLD AUTO: 0 K/UL (ref 0–0.04)
IMM GRANULOCYTES NFR BLD AUTO: 0 % (ref 0–0.5)
LYMPHOCYTES # BLD: 2 K/UL (ref 0.9–3.6)
LYMPHOCYTES NFR BLD: 28 % (ref 21–52)
MAGNESIUM SERPL-MCNC: 1.8 MG/DL (ref 1.6–2.6)
MCH RBC QN AUTO: 29.9 PG (ref 24–34)
MCHC RBC AUTO-ENTMCNC: 31.6 G/DL (ref 31–37)
MCV RBC AUTO: 94.6 FL (ref 78–100)
MONOCYTES # BLD: 0.7 K/UL (ref 0.05–1.2)
MONOCYTES NFR BLD: 10 % (ref 3–10)
NEUTS SEG # BLD: 3.8 K/UL (ref 1.8–8)
NEUTS SEG NFR BLD: 54 % (ref 40–73)
NRBC # BLD: 0 K/UL (ref 0–0.01)
NRBC BLD-RTO: 0 PER 100 WBC
PLATELET # BLD AUTO: 210 K/UL (ref 135–420)
PMV BLD AUTO: 10.2 FL (ref 9.2–11.8)
POTASSIUM SERPL-SCNC: 3.8 MMOL/L (ref 3.5–5.5)
RBC # BLD AUTO: 3.68 M/UL (ref 4.2–5.3)
SODIUM SERPL-SCNC: 141 MMOL/L (ref 136–145)
WBC # BLD AUTO: 7 K/UL (ref 4.6–13.2)

## 2024-04-13 PROCEDURE — 6370000000 HC RX 637 (ALT 250 FOR IP): Performed by: INTERNAL MEDICINE

## 2024-04-13 PROCEDURE — 6360000002 HC RX W HCPCS: Performed by: INTERNAL MEDICINE

## 2024-04-13 PROCEDURE — 2580000003 HC RX 258: Performed by: INTERNAL MEDICINE

## 2024-04-13 PROCEDURE — 85025 COMPLETE CBC W/AUTO DIFF WBC: CPT

## 2024-04-13 PROCEDURE — 36415 COLL VENOUS BLD VENIPUNCTURE: CPT

## 2024-04-13 PROCEDURE — 80048 BASIC METABOLIC PNL TOTAL CA: CPT

## 2024-04-13 PROCEDURE — 6360000002 HC RX W HCPCS: Performed by: HOSPITALIST

## 2024-04-13 PROCEDURE — 83735 ASSAY OF MAGNESIUM: CPT

## 2024-04-13 PROCEDURE — 1100000000 HC RM PRIVATE

## 2024-04-13 PROCEDURE — 6370000000 HC RX 637 (ALT 250 FOR IP): Performed by: HOSPITALIST

## 2024-04-13 PROCEDURE — 2580000003 HC RX 258: Performed by: HOSPITALIST

## 2024-04-13 RX ORDER — CITALOPRAM 20 MG/1
40 TABLET ORAL EVERY EVENING
Status: DISCONTINUED | OUTPATIENT
Start: 2024-04-13 | End: 2024-04-16 | Stop reason: HOSPADM

## 2024-04-13 RX ORDER — LOSARTAN POTASSIUM 25 MG/1
25 TABLET ORAL DAILY
Status: DISCONTINUED | OUTPATIENT
Start: 2024-04-13 | End: 2024-04-16 | Stop reason: HOSPADM

## 2024-04-13 RX ORDER — LUBIPROSTONE 24 UG/1
24 CAPSULE ORAL 2 TIMES DAILY WITH MEALS
Status: DISCONTINUED | OUTPATIENT
Start: 2024-04-13 | End: 2024-04-13

## 2024-04-13 RX ORDER — FUROSEMIDE 20 MG/1
20 TABLET ORAL DAILY
Status: DISCONTINUED | OUTPATIENT
Start: 2024-04-13 | End: 2024-04-16 | Stop reason: HOSPADM

## 2024-04-13 RX ORDER — DOCUSATE SODIUM 100 MG/1
100 CAPSULE, LIQUID FILLED ORAL DAILY
Status: DISCONTINUED | OUTPATIENT
Start: 2024-04-13 | End: 2024-04-16 | Stop reason: HOSPADM

## 2024-04-13 RX ADMIN — ACETAMINOPHEN 650 MG: 325 TABLET ORAL at 00:02

## 2024-04-13 RX ADMIN — LOSARTAN POTASSIUM 25 MG: 25 TABLET ORAL at 15:29

## 2024-04-13 RX ADMIN — PANTOPRAZOLE SODIUM 40 MG: 40 TABLET, DELAYED RELEASE ORAL at 06:51

## 2024-04-13 RX ADMIN — PIPERACILLIN AND TAZOBACTAM 3375 MG: 3; .375 INJECTION, POWDER, LYOPHILIZED, FOR SOLUTION INTRAVENOUS at 01:11

## 2024-04-13 RX ADMIN — AMITRIPTYLINE HYDROCHLORIDE 10 MG: 10 TABLET, FILM COATED ORAL at 09:52

## 2024-04-13 RX ADMIN — PIPERACILLIN AND TAZOBACTAM 3375 MG: 3; .375 INJECTION, POWDER, LYOPHILIZED, FOR SOLUTION INTRAVENOUS at 15:28

## 2024-04-13 RX ADMIN — GABAPENTIN 300 MG: 300 CAPSULE ORAL at 20:58

## 2024-04-13 RX ADMIN — SODIUM CHLORIDE, PRESERVATIVE FREE 10 ML: 5 INJECTION INTRAVENOUS at 09:54

## 2024-04-13 RX ADMIN — PIPERACILLIN AND TAZOBACTAM 3375 MG: 3; .375 INJECTION, POWDER, LYOPHILIZED, FOR SOLUTION INTRAVENOUS at 20:59

## 2024-04-13 RX ADMIN — LISINOPRIL 40 MG: 20 TABLET ORAL at 09:51

## 2024-04-13 RX ADMIN — ENOXAPARIN SODIUM 40 MG: 100 INJECTION SUBCUTANEOUS at 09:52

## 2024-04-13 RX ADMIN — Medication 1 CAPSULE: at 09:52

## 2024-04-13 RX ADMIN — CITALOPRAM HYDROBROMIDE 40 MG: 20 TABLET ORAL at 18:32

## 2024-04-13 RX ADMIN — FUROSEMIDE 20 MG: 20 TABLET ORAL at 15:28

## 2024-04-13 RX ADMIN — PIPERACILLIN AND TAZOBACTAM 3375 MG: 3; .375 INJECTION, POWDER, LYOPHILIZED, FOR SOLUTION INTRAVENOUS at 09:51

## 2024-04-13 RX ADMIN — POLYETHYLENE GLYCOL 3350 17 G: 17 POWDER, FOR SOLUTION ORAL at 09:52

## 2024-04-13 ASSESSMENT — PAIN DESCRIPTION - ORIENTATION
ORIENTATION: LEFT
ORIENTATION: RIGHT;LEFT

## 2024-04-13 ASSESSMENT — PAIN SCALES - GENERAL
PAINLEVEL_OUTOF10: 3
PAINLEVEL_OUTOF10: 5
PAINLEVEL_OUTOF10: 0
PAINLEVEL_OUTOF10: 0
PAINLEVEL_OUTOF10: 3
PAINLEVEL_OUTOF10: 2

## 2024-04-13 ASSESSMENT — PAIN DESCRIPTION - LOCATION
LOCATION: FOOT;LEG
LOCATION: FOOT

## 2024-04-13 ASSESSMENT — PAIN DESCRIPTION - DESCRIPTORS
DESCRIPTORS: ACHING
DESCRIPTORS: ACHING

## 2024-04-14 LAB
ANION GAP SERPL CALC-SCNC: 5 MMOL/L (ref 3–18)
BASOPHILS # BLD: 0.1 K/UL (ref 0–0.1)
BASOPHILS NFR BLD: 1 % (ref 0–2)
BUN SERPL-MCNC: 17 MG/DL (ref 7–18)
BUN/CREAT SERPL: 19 (ref 12–20)
CALCIUM SERPL-MCNC: 8.8 MG/DL (ref 8.5–10.1)
CHLORIDE SERPL-SCNC: 108 MMOL/L (ref 100–111)
CO2 SERPL-SCNC: 27 MMOL/L (ref 21–32)
CREAT SERPL-MCNC: 0.89 MG/DL (ref 0.6–1.3)
DIFFERENTIAL METHOD BLD: ABNORMAL
EOSINOPHIL # BLD: 0.4 K/UL (ref 0–0.4)
EOSINOPHIL NFR BLD: 6 % (ref 0–5)
ERYTHROCYTE [DISTWIDTH] IN BLOOD BY AUTOMATED COUNT: 12.8 % (ref 11.6–14.5)
GLUCOSE SERPL-MCNC: 123 MG/DL (ref 74–99)
HCT VFR BLD AUTO: 33.3 % (ref 35–45)
HGB BLD-MCNC: 10.9 G/DL (ref 12–16)
IMM GRANULOCYTES # BLD AUTO: 0 K/UL (ref 0–0.04)
IMM GRANULOCYTES NFR BLD AUTO: 0 % (ref 0–0.5)
LYMPHOCYTES # BLD: 1.8 K/UL (ref 0.9–3.6)
LYMPHOCYTES NFR BLD: 25 % (ref 21–52)
MAGNESIUM SERPL-MCNC: 1.8 MG/DL (ref 1.6–2.6)
MCH RBC QN AUTO: 30.5 PG (ref 24–34)
MCHC RBC AUTO-ENTMCNC: 32.7 G/DL (ref 31–37)
MCV RBC AUTO: 93.3 FL (ref 78–100)
MONOCYTES # BLD: 0.8 K/UL (ref 0.05–1.2)
MONOCYTES NFR BLD: 10 % (ref 3–10)
NEUTS SEG # BLD: 4.2 K/UL (ref 1.8–8)
NEUTS SEG NFR BLD: 57 % (ref 40–73)
NRBC # BLD: 0 K/UL (ref 0–0.01)
NRBC BLD-RTO: 0 PER 100 WBC
PLATELET # BLD AUTO: 218 K/UL (ref 135–420)
PMV BLD AUTO: 10.6 FL (ref 9.2–11.8)
POTASSIUM SERPL-SCNC: 3.9 MMOL/L (ref 3.5–5.5)
RBC # BLD AUTO: 3.57 M/UL (ref 4.2–5.3)
SODIUM SERPL-SCNC: 140 MMOL/L (ref 136–145)
WBC # BLD AUTO: 7.3 K/UL (ref 4.6–13.2)

## 2024-04-14 PROCEDURE — 1100000000 HC RM PRIVATE

## 2024-04-14 PROCEDURE — 36415 COLL VENOUS BLD VENIPUNCTURE: CPT

## 2024-04-14 PROCEDURE — 97110 THERAPEUTIC EXERCISES: CPT

## 2024-04-14 PROCEDURE — 2580000003 HC RX 258: Performed by: HOSPITALIST

## 2024-04-14 PROCEDURE — 80048 BASIC METABOLIC PNL TOTAL CA: CPT

## 2024-04-14 PROCEDURE — 6370000000 HC RX 637 (ALT 250 FOR IP): Performed by: HOSPITALIST

## 2024-04-14 PROCEDURE — 83735 ASSAY OF MAGNESIUM: CPT

## 2024-04-14 PROCEDURE — 6360000002 HC RX W HCPCS: Performed by: INTERNAL MEDICINE

## 2024-04-14 PROCEDURE — 6370000000 HC RX 637 (ALT 250 FOR IP): Performed by: INTERNAL MEDICINE

## 2024-04-14 PROCEDURE — 85025 COMPLETE CBC W/AUTO DIFF WBC: CPT

## 2024-04-14 PROCEDURE — 6360000002 HC RX W HCPCS: Performed by: HOSPITALIST

## 2024-04-14 PROCEDURE — 2580000003 HC RX 258: Performed by: INTERNAL MEDICINE

## 2024-04-14 RX ORDER — DICYCLOMINE HYDROCHLORIDE 10 MG/1
20 CAPSULE ORAL 4 TIMES DAILY PRN
Status: DISCONTINUED | OUTPATIENT
Start: 2024-04-14 | End: 2024-04-16 | Stop reason: HOSPADM

## 2024-04-14 RX ADMIN — GABAPENTIN 300 MG: 300 CAPSULE ORAL at 20:54

## 2024-04-14 RX ADMIN — PIPERACILLIN AND TAZOBACTAM 3375 MG: 3; .375 INJECTION, POWDER, LYOPHILIZED, FOR SOLUTION INTRAVENOUS at 15:51

## 2024-04-14 RX ADMIN — AMITRIPTYLINE HYDROCHLORIDE 10 MG: 10 TABLET, FILM COATED ORAL at 09:57

## 2024-04-14 RX ADMIN — PIPERACILLIN AND TAZOBACTAM 3375 MG: 3; .375 INJECTION, POWDER, LYOPHILIZED, FOR SOLUTION INTRAVENOUS at 10:08

## 2024-04-14 RX ADMIN — PANTOPRAZOLE SODIUM 40 MG: 40 TABLET, DELAYED RELEASE ORAL at 06:17

## 2024-04-14 RX ADMIN — CITALOPRAM HYDROBROMIDE 40 MG: 20 TABLET ORAL at 18:52

## 2024-04-14 RX ADMIN — PIPERACILLIN AND TAZOBACTAM 3375 MG: 3; .375 INJECTION, POWDER, LYOPHILIZED, FOR SOLUTION INTRAVENOUS at 03:48

## 2024-04-14 RX ADMIN — LOSARTAN POTASSIUM 25 MG: 25 TABLET ORAL at 09:57

## 2024-04-14 RX ADMIN — FUROSEMIDE 20 MG: 20 TABLET ORAL at 09:57

## 2024-04-14 RX ADMIN — ENOXAPARIN SODIUM 40 MG: 100 INJECTION SUBCUTANEOUS at 09:57

## 2024-04-14 RX ADMIN — ACETAMINOPHEN 650 MG: 325 TABLET ORAL at 16:13

## 2024-04-14 RX ADMIN — ACETAMINOPHEN 650 MG: 325 TABLET ORAL at 22:01

## 2024-04-14 RX ADMIN — DICYCLOMINE HYDROCHLORIDE 20 MG: 10 CAPSULE ORAL at 22:01

## 2024-04-14 RX ADMIN — Medication 1 CAPSULE: at 09:57

## 2024-04-14 RX ADMIN — SODIUM CHLORIDE, PRESERVATIVE FREE 10 ML: 5 INJECTION INTRAVENOUS at 20:54

## 2024-04-14 RX ADMIN — SODIUM CHLORIDE, PRESERVATIVE FREE 10 ML: 5 INJECTION INTRAVENOUS at 09:58

## 2024-04-14 RX ADMIN — PIPERACILLIN AND TAZOBACTAM 3375 MG: 3; .375 INJECTION, POWDER, LYOPHILIZED, FOR SOLUTION INTRAVENOUS at 20:53

## 2024-04-14 RX ADMIN — DICYCLOMINE HYDROCHLORIDE 20 MG: 10 CAPSULE ORAL at 13:36

## 2024-04-14 ASSESSMENT — PAIN DESCRIPTION - ORIENTATION: ORIENTATION: LEFT;UPPER

## 2024-04-14 ASSESSMENT — PAIN SCALES - GENERAL
PAINLEVEL_OUTOF10: 0
PAINLEVEL_OUTOF10: 5
PAINLEVEL_OUTOF10: 3
PAINLEVEL_OUTOF10: 4

## 2024-04-14 ASSESSMENT — PAIN DESCRIPTION - DESCRIPTORS
DESCRIPTORS: DISCOMFORT;CRAMPING
DESCRIPTORS: CRAMPING

## 2024-04-14 ASSESSMENT — PAIN DESCRIPTION - LOCATION
LOCATION: ABDOMEN
LOCATION: ABDOMEN;FOOT
LOCATION: ABDOMEN

## 2024-04-14 NOTE — PLAN OF CARE
Problem: Pain  Goal: Verbalizes/displays adequate comfort level or baseline comfort level  4/14/2024 0305 by Jaycee Rider, RN  Outcome: Progressing  Flowsheets (Taken 4/13/2024 2300)  Verbalizes/displays adequate comfort level or baseline comfort level:   Encourage patient to monitor pain and request assistance   Assess pain using appropriate pain scale   Administer analgesics based on type and severity of pain and evaluate response   Implement non-pharmacological measures as appropriate and evaluate response   Notify Licensed Independent Practitioner if interventions unsuccessful or patient reports new pain  4/13/2024 1321 by Esmer De Jesus RN  Outcome: Progressing     Problem: Skin/Tissue Integrity  Goal: Absence of new skin breakdown  Description: 1.  Monitor for areas of redness and/or skin breakdown  2.  Assess vascular access sites hourly  3.  Every 4-6 hours minimum:  Change oxygen saturation probe site  4.  Every 4-6 hours:  If on nasal continuous positive airway pressure, respiratory therapy assess nares and determine need for appliance change or resting period.  4/14/2024 0305 by Jaycee Rider, RN  Outcome: Progressing     Problem: ABCDS Injury Assessment  Goal: Absence of physical injury  4/14/2024 0305 by Jaycee Rider, RN  Outcome: Progressing  Flowsheets (Taken 4/13/2024 2248)  Absence of Physical Injury: Implement safety measures based on patient assessment  4/13/2024 1321 by Esmer De Jesus RN  Outcome: Progressing     Problem: Safety - Adult  Goal: Free from fall injury  4/14/2024 0305 by Jaycee Rider, RN  Outcome: Progressing  Flowsheets (Taken 4/13/2024 2248)  Free From Fall Injury: Instruct family/caregiver on patient safety  4/13/2024 1321 by Esmer De Jesus RN  Outcome: Progressing

## 2024-04-15 PROBLEM — B96.29 UTI DUE TO EXTENDED-SPECTRUM BETA LACTAMASE (ESBL) PRODUCING ESCHERICHIA COLI: Status: ACTIVE | Noted: 2024-04-15

## 2024-04-15 PROBLEM — N39.0 UTI DUE TO EXTENDED-SPECTRUM BETA LACTAMASE (ESBL) PRODUCING ESCHERICHIA COLI: Status: ACTIVE | Noted: 2024-04-15

## 2024-04-15 PROBLEM — Z16.12 UTI DUE TO EXTENDED-SPECTRUM BETA LACTAMASE (ESBL) PRODUCING ESCHERICHIA COLI: Status: ACTIVE | Noted: 2024-04-15

## 2024-04-15 LAB
ANION GAP SERPL CALC-SCNC: 9 MMOL/L (ref 3–18)
BACTERIA SPEC CULT: ABNORMAL
BASOPHILS # BLD: 0.1 K/UL (ref 0–0.1)
BASOPHILS NFR BLD: 2 % (ref 0–2)
BUN SERPL-MCNC: 18 MG/DL (ref 7–18)
BUN/CREAT SERPL: 25 (ref 12–20)
CALCIUM SERPL-MCNC: 8.9 MG/DL (ref 8.5–10.1)
CC UR VC: ABNORMAL
CHLORIDE SERPL-SCNC: 107 MMOL/L (ref 100–111)
CO2 SERPL-SCNC: 25 MMOL/L (ref 21–32)
CREAT SERPL-MCNC: 0.73 MG/DL (ref 0.6–1.3)
DIFFERENTIAL METHOD BLD: ABNORMAL
EOSINOPHIL # BLD: 0.4 K/UL (ref 0–0.4)
EOSINOPHIL NFR BLD: 7 % (ref 0–5)
ERYTHROCYTE [DISTWIDTH] IN BLOOD BY AUTOMATED COUNT: 12.8 % (ref 11.6–14.5)
GLUCOSE SERPL-MCNC: 97 MG/DL (ref 74–99)
HCT VFR BLD AUTO: 36.2 % (ref 35–45)
HGB BLD-MCNC: 11.9 G/DL (ref 12–16)
IMM GRANULOCYTES # BLD AUTO: 0 K/UL (ref 0–0.04)
IMM GRANULOCYTES NFR BLD AUTO: 0 % (ref 0–0.5)
LYMPHOCYTES # BLD: 2 K/UL (ref 0.9–3.6)
LYMPHOCYTES NFR BLD: 30 % (ref 21–52)
MCH RBC QN AUTO: 30.8 PG (ref 24–34)
MCHC RBC AUTO-ENTMCNC: 32.9 G/DL (ref 31–37)
MCV RBC AUTO: 93.8 FL (ref 78–100)
MONOCYTES # BLD: 0.7 K/UL (ref 0.05–1.2)
MONOCYTES NFR BLD: 11 % (ref 3–10)
NEUTS SEG # BLD: 3.4 K/UL (ref 1.8–8)
NEUTS SEG NFR BLD: 51 % (ref 40–73)
NRBC # BLD: 0 K/UL (ref 0–0.01)
NRBC BLD-RTO: 0 PER 100 WBC
PLATELET # BLD AUTO: 252 K/UL (ref 135–420)
PMV BLD AUTO: 10.3 FL (ref 9.2–11.8)
POTASSIUM SERPL-SCNC: 3.5 MMOL/L (ref 3.5–5.5)
RBC # BLD AUTO: 3.86 M/UL (ref 4.2–5.3)
SERVICE CMNT-IMP: ABNORMAL
SODIUM SERPL-SCNC: 141 MMOL/L (ref 136–145)
WBC # BLD AUTO: 6.7 K/UL (ref 4.6–13.2)

## 2024-04-15 PROCEDURE — 6360000002 HC RX W HCPCS: Performed by: HOSPITALIST

## 2024-04-15 PROCEDURE — 97110 THERAPEUTIC EXERCISES: CPT

## 2024-04-15 PROCEDURE — 6370000000 HC RX 637 (ALT 250 FOR IP): Performed by: HOSPITALIST

## 2024-04-15 PROCEDURE — 6360000002 HC RX W HCPCS: Performed by: INTERNAL MEDICINE

## 2024-04-15 PROCEDURE — 97535 SELF CARE MNGMENT TRAINING: CPT

## 2024-04-15 PROCEDURE — 36415 COLL VENOUS BLD VENIPUNCTURE: CPT

## 2024-04-15 PROCEDURE — 97530 THERAPEUTIC ACTIVITIES: CPT

## 2024-04-15 PROCEDURE — 6370000000 HC RX 637 (ALT 250 FOR IP): Performed by: INTERNAL MEDICINE

## 2024-04-15 PROCEDURE — 1100000000 HC RM PRIVATE

## 2024-04-15 PROCEDURE — 2580000003 HC RX 258: Performed by: HOSPITALIST

## 2024-04-15 PROCEDURE — 80048 BASIC METABOLIC PNL TOTAL CA: CPT

## 2024-04-15 PROCEDURE — 2580000003 HC RX 258: Performed by: INTERNAL MEDICINE

## 2024-04-15 PROCEDURE — 85025 COMPLETE CBC W/AUTO DIFF WBC: CPT

## 2024-04-15 RX ADMIN — DICYCLOMINE HYDROCHLORIDE 20 MG: 10 CAPSULE ORAL at 13:17

## 2024-04-15 RX ADMIN — LORAZEPAM 0.5 MG: 0.5 TABLET ORAL at 03:20

## 2024-04-15 RX ADMIN — GABAPENTIN 300 MG: 300 CAPSULE ORAL at 21:20

## 2024-04-15 RX ADMIN — PIPERACILLIN AND TAZOBACTAM 3375 MG: 3; .375 INJECTION, POWDER, LYOPHILIZED, FOR SOLUTION INTRAVENOUS at 10:25

## 2024-04-15 RX ADMIN — SODIUM CHLORIDE 25 ML: 9 INJECTION, SOLUTION INTRAVENOUS at 21:21

## 2024-04-15 RX ADMIN — ENOXAPARIN SODIUM 40 MG: 100 INJECTION SUBCUTANEOUS at 09:30

## 2024-04-15 RX ADMIN — LOSARTAN POTASSIUM 25 MG: 25 TABLET ORAL at 09:30

## 2024-04-15 RX ADMIN — PANTOPRAZOLE SODIUM 40 MG: 40 TABLET, DELAYED RELEASE ORAL at 07:37

## 2024-04-15 RX ADMIN — PIPERACILLIN AND TAZOBACTAM 3375 MG: 3; .375 INJECTION, POWDER, LYOPHILIZED, FOR SOLUTION INTRAVENOUS at 21:20

## 2024-04-15 RX ADMIN — POLYETHYLENE GLYCOL 3350 17 G: 17 POWDER, FOR SOLUTION ORAL at 10:09

## 2024-04-15 RX ADMIN — FUROSEMIDE 20 MG: 20 TABLET ORAL at 09:30

## 2024-04-15 RX ADMIN — CITALOPRAM HYDROBROMIDE 40 MG: 20 TABLET ORAL at 19:22

## 2024-04-15 RX ADMIN — PIPERACILLIN AND TAZOBACTAM 3375 MG: 3; .375 INJECTION, POWDER, LYOPHILIZED, FOR SOLUTION INTRAVENOUS at 03:21

## 2024-04-15 RX ADMIN — AMITRIPTYLINE HYDROCHLORIDE 10 MG: 10 TABLET, FILM COATED ORAL at 09:30

## 2024-04-15 RX ADMIN — DOCUSATE SODIUM 100 MG: 100 CAPSULE, LIQUID FILLED ORAL at 09:30

## 2024-04-15 RX ADMIN — SODIUM CHLORIDE, PRESERVATIVE FREE 10 ML: 5 INJECTION INTRAVENOUS at 10:10

## 2024-04-15 RX ADMIN — Medication 1 CAPSULE: at 09:30

## 2024-04-15 ASSESSMENT — PAIN DESCRIPTION - ORIENTATION: ORIENTATION: LEFT;RIGHT

## 2024-04-15 ASSESSMENT — PAIN SCALES - GENERAL
PAINLEVEL_OUTOF10: 6
PAINLEVEL_OUTOF10: 0
PAINLEVEL_OUTOF10: 6

## 2024-04-15 ASSESSMENT — PAIN DESCRIPTION - LOCATION
LOCATION: FOOT
LOCATION: ABDOMEN

## 2024-04-15 ASSESSMENT — PAIN DESCRIPTION - DESCRIPTORS: DESCRIPTORS: CRAMPING

## 2024-04-15 NOTE — CONSULTS
[facility]  (A Division of [facility])                                                           Date of Admission: 4/11/2024       Reason for Consult: ESBL positive culture  Referring MD: Akilah Deutsch    C/C: left knee pain    Current Antimicrobials:    Prior Antimicrobials:    Zosyn 4/11 to date      Allergy to antibiotics:       Assessment--ID related:     Asymptomatic bacteruria-- e.coli esbl together with mixed bacteria  Osteo-arthritis/knee pain- reason for admission    Microlab data:    4/12- UA with mild pyuria, 4+bacteria, Urine culture with mixed pathogen, ESBL E.col >100K    Respiratory culture    Additional data:  4/11-Vascular negative for DVT BL LE    Co-morbidities:  Cerebral palsy-WC dependent, IBS    Recommendation -- ID related:        Notes/Labs/Cultures and Imaging reports reviewed --afebrile, no leucocytosis, no urinary symptom prior to admission. Loose bm since starting Zosyn    DC Zosyn  No new antibiotic needed for ESBL E.coli bacteruria-- hopefully her kamilla will change to more benign germs. If sign of sepsis, UTI-- repeat UA and repeat cultures indicated prior to emperic carbapenems      Thank you for involving me in the care of this patient. Please do not hesitate to contact me on the above number if question or concern.         Discussed with ( in bold) 1. Patient / family-her father 2. MD-Dr Hutchison/Nursing 3. Microbiology/ Labratory 4. Radiology  5./case management 6. Others      HPI:      Desire Florez is a 63 y.o. female with PMH of cerebral palsy. Admitted on 4/11 with knee pain, issues with not being able to provide care by her 81-year-old father is concerned about taking care of her in the home.     No prior N/V/dysuria at home.  No fever/leucocytosis on admission. But UA was abnormal. Started on Zosyn and the final culture now shows ESBL E.coli in mixed urine cultures.    She complains of loose BM and discomfort with BM

## 2024-04-16 VITALS
WEIGHT: 157.63 LBS | SYSTOLIC BLOOD PRESSURE: 127 MMHG | DIASTOLIC BLOOD PRESSURE: 63 MMHG | BODY MASS INDEX: 35.46 KG/M2 | RESPIRATION RATE: 16 BRPM | HEART RATE: 70 BPM | TEMPERATURE: 97.5 F | HEIGHT: 56 IN | OXYGEN SATURATION: 98 %

## 2024-04-16 PROCEDURE — 6370000000 HC RX 637 (ALT 250 FOR IP): Performed by: INTERNAL MEDICINE

## 2024-04-16 PROCEDURE — 2580000003 HC RX 258: Performed by: HOSPITALIST

## 2024-04-16 PROCEDURE — 6370000000 HC RX 637 (ALT 250 FOR IP): Performed by: HOSPITALIST

## 2024-04-16 PROCEDURE — 6360000002 HC RX W HCPCS: Performed by: HOSPITALIST

## 2024-04-16 RX ORDER — PSEUDOEPHEDRINE HCL 30 MG
100 TABLET ORAL DAILY
Qty: 10 CAPSULE | Refills: 0
Start: 2024-04-17

## 2024-04-16 RX ORDER — GABAPENTIN 300 MG/1
300 CAPSULE ORAL
Qty: 1 CAPSULE | Refills: 0 | Status: SHIPPED | OUTPATIENT
Start: 2024-04-16 | End: 2024-04-17

## 2024-04-16 RX ORDER — CITALOPRAM 40 MG/1
40 TABLET ORAL EVERY EVENING
Qty: 1 TABLET | Refills: 0 | Status: SHIPPED | OUTPATIENT
Start: 2024-04-16 | End: 2024-04-17

## 2024-04-16 RX ADMIN — DICYCLOMINE HYDROCHLORIDE 20 MG: 10 CAPSULE ORAL at 14:09

## 2024-04-16 RX ADMIN — ACETAMINOPHEN 650 MG: 325 TABLET ORAL at 14:10

## 2024-04-16 RX ADMIN — ENOXAPARIN SODIUM 40 MG: 100 INJECTION SUBCUTANEOUS at 09:40

## 2024-04-16 RX ADMIN — Medication 1 CAPSULE: at 09:40

## 2024-04-16 RX ADMIN — AMITRIPTYLINE HYDROCHLORIDE 10 MG: 10 TABLET, FILM COATED ORAL at 09:40

## 2024-04-16 RX ADMIN — DOCUSATE SODIUM 100 MG: 100 CAPSULE, LIQUID FILLED ORAL at 09:40

## 2024-04-16 RX ADMIN — FUROSEMIDE 20 MG: 20 TABLET ORAL at 09:40

## 2024-04-16 RX ADMIN — SODIUM CHLORIDE, PRESERVATIVE FREE 10 ML: 5 INJECTION INTRAVENOUS at 09:43

## 2024-04-16 RX ADMIN — LOSARTAN POTASSIUM 25 MG: 25 TABLET ORAL at 09:40

## 2024-04-16 RX ADMIN — PANTOPRAZOLE SODIUM 40 MG: 40 TABLET, DELAYED RELEASE ORAL at 06:03

## 2024-04-16 ASSESSMENT — PAIN DESCRIPTION - LOCATION
LOCATION: FOOT
LOCATION: ABDOMEN

## 2024-04-16 ASSESSMENT — PAIN SCALES - GENERAL: PAINLEVEL_OUTOF10: 0

## 2024-04-16 ASSESSMENT — PAIN DESCRIPTION - DESCRIPTORS: DESCRIPTORS: DISCOMFORT

## 2024-04-16 NOTE — PLAN OF CARE
Problem: Pain  Goal: Verbalizes/displays adequate comfort level or baseline comfort level  Outcome: Progressing     Problem: Skin/Tissue Integrity  Goal: Absence of new skin breakdown  Description: 1.  Monitor for areas of redness and/or skin breakdown  2.  Assess vascular access sites hourly  3.  Every 4-6 hours minimum:  Change oxygen saturation probe site  4.  Every 4-6 hours:  If on nasal continuous positive airway pressure, respiratory therapy assess nares and determine need for appliance change or resting period.  Outcome: Progressing     Problem: ABCDS Injury Assessment  Goal: Absence of physical injury  Outcome: Progressing     Problem: Safety - Adult  Goal: Free from fall injury  Outcome: Progressing

## 2024-04-16 NOTE — DISCHARGE SUMMARY
Discharge Summary    Patient: Desire Florez MRN: [id number]  CSN: [id number]    YOB: 1960  Age: 63 y.o.  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]  Ambulatory dysfunction [R26.2]  Cerebral palsy, unspecified type (HCC) [G80.9]  Unable to care for self [Z78.9]    Discharge Diagnoses:    Active Hospital Problems    Diagnosis Date Noted    UTI due to extended-spectrum beta lactamase (ESBL) producing Escherichia coli [N39.0, B96.29, Z16.12] 04/15/2024    Ambulatory dysfunction [R26.2] 04/11/2024    BMI 34.0-34.9,adult [Z68.34] 04/11/2024    HTN (hypertension) [I10] 04/11/2024    GERD (gastroesophageal reflux disease) [K21.9] 04/11/2024    Cerebral palsy (HCC) [G80.9] 08/09/2013    Osteoarthritis of ankle or foot [M19.079] 08/09/2013       Discharge Condition: stable     Discharge Medications:        Medication List        START taking these medications      docusate 100 MG Caps  Commonly known as: COLACE, DULCOLAX  Take 100 mg by mouth daily  Start taking on: April 17, 2024            CONTINUE taking these medications      acetaminophen 650 MG extended release tablet  Commonly known as: TYLENOL     amitriptyline 10 MG tablet  Commonly known as: ELAVIL     CENTRUM WOMEN PO     citalopram 40 MG tablet  Commonly known as: CELEXA  Take 1 tablet by mouth every evening for 1 day Indications: anxiety/depression     gabapentin 300 MG capsule  Commonly known as: NEURONTIN  Take 1 capsule by mouth nightly for 1 day. Indications: nerve pain Max Daily Amount: 300 mg     omeprazole 20 MG delayed release capsule  Commonly known as: PRILOSEC     ramipril 10 MG capsule  Commonly known as: ALTACE     VITAMIN B COMPLEX PO     vitamin D 25 MCG (1000 UT) Tabs tablet  Commonly known as: CHOLECALCIFEROL            STOP taking these medications

## 2024-04-16 NOTE — PROGRESS NOTES
Hospitalist Progress Note    Patient: Desire Florez MRN: [id number]  CSN: [id number]    YOB: 1960  Age: 63 y.o.  Sex: female    DOA: 4/11/2024 LOS:  LOS: 3 days                Assessment and Plan:    Principal Problem:    Ambulatory dysfunction  Active Problems:    Cerebral palsy (HCC)    Osteoarthritis of ankle or foot    BMI 34.0-34.9,adult    HTN (hypertension)    GERD (gastroesophageal reflux disease)  Resolved Problems:    * No resolved hospital problems. *      Ambulatory dysfunction: she needs to have snf admission and pt/ot  Htn: this is controlled   GErD on ppi  Cerebral palsy  Anxiety: on medicine      bentyl for abdominal cramping     will likely need SNF     Chief complaint:  Desire Florez is a 63 y.o. female with hx of cerebral palsy, foot ankle , htn ,gerd  bmi 34, urethral stricture was sent to ER due to worsening ambulatory dysfunction. She received ankle surgery recently and dc from rehab, she had more difficulty ambulate at home.  She was not able to bend her left knee       Subjective:    She has some abdominal cramping      Review of systems:    General: No fevers or chills.  Cardiovascular: No chest pain or pressure. No palpitations.   Pulmonary: No shortness of breath.   Gastrointestinal: No nausea, vomiting.     Objective:    Vital signs/Intake and Output:  Visit Vitals  BP (!) 142/80   Pulse 70   Temp 97.3 °F (36.3 °C) (Axillary)   Resp 18   Ht 1.422 m (4' 8\")   Wt 71.5 kg (157 lb 10.1 oz)   SpO2 95%   BMI 35.34 kg/m²     Current Shift:  04/14 0701 - 04/14 1900  In: 240 [P.O.:240]  Out: 500 [Urine:500]  Last three shifts:  04/12 1901 - 04/14 0700  In: 1680 [P.O.:1680]  Out: 2100 [Urine:2100]    Physical Exam:  General: NAD, AAOx3. Non-toxic.  HEENT: NC/AT.  PERRLA, EOMI.  MMM.  Lungs: Nml inspection. CTA B/L. No wheezing, rales or rhonchi.   Heart:  S1S2 RRR,  PMI mid 5th IC space. No M/RG.  Abdomen: Soft, NT/ND.  BS+. No peritoneal signs.  Extremities: No C/C/E.  Psych:  
  Hospitalist Progress Note    Patient: Desire Florez MRN: [id number]  [facility]: [id number]    YOB: 1960  Age: 63 y.o.  Sex: female    DOA: 4/11/2024 LOS:  LOS: 2 days                Assessment and Plan:    Principal Problem:    Ambulatory dysfunction  Active Problems:    Cerebral palsy (HCC)    Osteoarthritis of ankle or foot    BMI 34.0-34.9,adult    HTN (hypertension)    GERD (gastroesophageal reflux disease)  Resolved Problems:    * No resolved hospital problems. *      Ambulatory dysfunction: she needs to have snf admission and pt/ot  Htn: this is controlled but will change to her normal medicine  GErD on ppi  Cerebral palsy  Anxiety          Chief complaint:  Desire Florez is a 63 y.o. female with hx of cerebral palsy, foot ankle , htn ,gerd  bmi 34, urethral stricture was sent to ER due to worsening ambulatory dysfunction. She received ankle surgery recently and dc from rehab, she had more difficulty ambulate at home.  She was not able to bend her left knee       Subjective:    She feels ok overall      Review of systems:    General: No fevers or chills.  Cardiovascular: No chest pain or pressure. No palpitations.   Pulmonary: No shortness of breath.   Gastrointestinal: No nausea, vomiting.     Objective:    Vital signs/Intake and Output:  Visit Vitals  /63   Pulse 65   Temp 98.1 °F (36.7 °C) (Oral)   Resp 16   Ht 1.422 m (4' 8\")   Wt 71.5 kg (157 lb 10.1 oz)   SpO2 100%   BMI 35.34 kg/m²     Current Shift:  04/13 0701 - 04/13 1900  In: 720 [P.O.:720]  Out: 300 [Urine:300]  Last three shifts:  04/11 1901 - 04/13 0700  In: 900 [P.O.:900]  Out: 750 [Urine:750]    Physical Exam:  General: NAD, AAOx3. Non-toxic.  HEENT: NC/AT.  PERRLA, EOMI.  MMM.  Lungs: Nml inspection. CTA B/L. No wheezing, rales or rhonchi.   Heart:  S1S2 RRR,  PMI mid 5th IC space. No M/RG.  Abdomen: Soft, NT/ND.  BS+. No peritoneal signs.  Extremities: No C/C/E.  Psych:   Nml affect.  Neurologic:  2-12 intact.  Strength 5/5 
  Physician Progress Note      PATIENT:               ANGELA KLEIN  CSN #:                  [id number]  :                       1960  ADMIT DATE:       2024 1:13 PM  DISCH DATE:  RESPONDING  PROVIDER #:        Akilah Hutchison MD          QUERY TEXT:    Pt admitted with Ambulatory dysfunction. Pt noted to have Esbl uti, Cerebral   palsy, HTN. If possible, please document in progress notes and discharge   summary the relationship, if any, between Ambulatory dysfunction and Esbl uti.    The medical record reflects the following:  Risk Factors: Esbl uti, Cerebral palsy, HTN.  Clinical Indicators: H&P -ER due to worsening ambulatory dysfunction. She   received ankle surgery recently and dc from rehab, she had more difficulty   ambulate at home.  She was not able to bend her left knee.  Treatment: On Zosyn, Continue home medication, PT/OT.    Thank you,  Carine Garzon, [facility], S.  Options provided:  -- Ambulatory dysfunction due to Esbl uti  -- Ambulatory dysfunction due to Cerebral palsy  -- Ambulatory dysfunction due to HTN  -- Ambulatory dysfunction due to Anxiety  -- Ambulatory dysfunction due to post ankle surgery  -- Other - I will add my own diagnosis  -- Disagree - Not applicable / Not valid  -- Disagree - Clinically unable to determine / Unknown  -- Refer to Clinical Documentation Reviewer    PROVIDER RESPONSE TEXT:    This patient has Ambulatory dysfunction due to Cerebral palsy.    Query created by: Carine Garzon on 2024 5:58 AM      Electronically signed by:  Akilah Hutchison MD 2024 7:13 AM          
0748-  Bedside and Verbal shift change report given to HARLEY Arce (oncoming nurse) by HARLEY Flores (offgoing nurse). Report included the following information Nurse Handoff Report, Adult Overview, Surgery Report, Intake/Output, MAR, and Recent Results.  Assumed care of pt at this time.     0930-  Assessment completed per flowsheet.    1332-  Brief changed, gretchen care provided, new purewick applied.     7568-  Bedside and Verbal shift change report given to HARLEY Lawrence (oncoming nurse) by HARLEY Arce (offgoing nurse). Report included the following information Nurse Handoff Report, Adult Overview, Surgery Report, Intake/Output, MAR, and Recent Results.    
1363  Rajinder Car RN,  from Labs called with a critical result for patient in 319. Ecoli found in urine, patient positive for ESBL. Dr Hutchison notified.  
1920-Bedside report received from HARLEY Pereyra. Pt A & O X 4. Pain at 3. Pt without any issues. Call light within reach.  2045-Pt resting in bed at this time. IV site to L AC  patent and intact. Pt A & O x 4. LS clear, on RA. +  Pain at 3. + BS to all 4 quadrants. Pt denies nausea. Call light within raech. Pt denies any needs at this time.    Pt had an uneventful shift. Pain remained well-controlled with the ordered medication. Pt wishes to talk to the CM about her discharge. No other issues/concerns at this time. Call bell within reach    
1952: Bedside report received from HARLEY Noel (offgoing nurse) to HARLEY Cope (oncoming nurse), bedside report included nurse Handoff Report, MAR, & labs, Pt observed resting in bed at safe position, Pt without any issues. Call light within reach, ongoing monitoring in place.    2120: Shift assessment completed, Pt stable at this moment, no s/sx of distress, A&O x4, neuro WDL, chest rise and fall equally, no SOB noted, HOB elevated, LS CTA in all lobes bilaterally A&P, HS S1S2 stable & reg, ABD is soft, non-distended & non-tender, Bs x4 normoactive, c/o loose stools r/t ABT, +2 palpable peripheral pulses bilaterally in all extremities,flaccid BLE, 20g IV noted to Lt AC is patent, intact & SL, transparent dressing in C/D/I, pain rated 0/10, Pt resting in bed at safe position & call bell within reach, ongoing monitoring in place.     0235: Pt refused scheduled zosyn r/t c/o diarrhea & stating, \"MD told me that I do not need it anymore\", Pt education provided, and clarified with Pt that I&D recommended for Abx to stop however, order needed from attending provider, Pt verbalized understanding but still chose to not take Abx.     0340: Shift reassessment completed, no changes noted at this moment, Pt stable resting in bed at safe position, call bell within reach, ongoing monitoring in place    0642: Pt had an uneventful shift. Uses IS every hour while awake. Pt ambulated , Pain remained well-controlled, No issues/concerns at this time. Pt resting in bed at safe position, call bell within reach    0721: Report given to HARLEY Lyman & HARLEY Post (oncoming nurse) by HARLEY Cope (offoing nurse), bedside report included Nurse Handoff Report, MAR, & labs.    
19:55 Assessment completed. Lungs are clear bilat. Resting quietly in bed with visitor @ bedside.    22:50 Shift assessment completed. See nsg flow sheet for details.    03:00 Reassessed with 0 changes noted. Offers 0 c/o pain or discomfort. Resting quietly in bed with eyes closed between cares.    07:50 Bedside and Verbal shift change report given to NAHOMY Rider RN (oncoming nurse) by FELIPE De Jesus RN (offgoing nurse). Report included the following information Nurse Handoff Report.     
Bedside and Verbal shift change report given to Marion RN  (oncoming nurse) by Esmer RN (offgoing nurse). Report included the following information Nurse Handoff Report, Adult Overview, and Intake/Output.                
Bedside and Verbal shift change report given to Sadie RN (oncoming nurse) Report included the following information: SBAR, Nurse Handoff Report, Adult Overview, Intake/Output, MAR, and Recent Results.   
Bedside shift change report given to HARLEY Cope (oncoming nurse) by Sadie Castillo RN   (offgoing nurse). Report included the following information Nurse Handoff Report, Index, Intake/Output, MAR, and Recent Results  .     
CHEO called [facility] # [phone] and spoke Carlene, to inquire if they could complete the assessment for DME product at patient's bedside. Moe advised they do not have an order for patient. CHEO obtained [facility]# [phone], and called, the office was not open yet to receive calls. CHEO will re-attempt at a later time. CHEO to continue to follow.    Aaron Jiménez, MSW  Supervisee in Social Work  Care Management Department     
CHEO contacted [facility] for [facility] # [phone], who requested the family to call her directly to regarding payor source and proof. SW called and provided patient's father the contact number to call Flori. CHEO will continue to follow.       CASSI Lau  Supervisee in Social Work  Care Management Department   
CHEO used precautionary measure to met with patient and her father at bedside. The family stated their preference is [facility] instead of [facility]. Patient became upset to hear that they would not accept her electric wheelchair. CHEO updated [facility] Intake Rep., Flori # [phone], about patient's preference and inquired about the electric wheelchair. Flori advised that patient must first be assessed and cleared by PT to use the electric wheelchair. CHEO updated patient regarding the electric wheelchair. CHEO with the help of the unit secretary arranged for transport via [facility]. Transport pickup time is 1630. Patient will discharge to:    [place] [address]    Transport  time: 4:30 pm (1630)    Nurse report # : [phone]      CHEO updated patient and floor nurse about the transport time. Floor nurse confirmed the  report number received.    CASSI Lua  Supervisee in Social Work  Care Management Department    
Care  assisting Care Manager CASSI Lau and CHEO Dodson with Discharge Planning needs for patient.  Referrals sent to area SNF's in [facility] for review and consideration for placement.    Will follow along for further discharge plans.  
Care Mgmt contacted patient's father, Mitch Florez via telephone at [phone] to review/discuss 2nd IMM. Mr. Florez acknowledged understanding of letter and right to appeal.  Copy of letter placed in patient's room as requested.  Original placed in patient's chart.  
Physical Therapy Goals:  Initiated 4/12/2024 to be met within 7-10 days.  Short Term Goals  Short Term Goal 1: Patient will perform rolling and supine to/from sit with mod/max assist.  Short Term Goal 2: Patient will demonstrate static sitting balance EOB x5-10 min with CGA/min A for balance/ADL activity.  Short Term Goal 3: Patient to perform sit to/from stand with mod/max  assist and SW in prep for transfer bed to chair.       PHYSICAL THERAPY TREATMENT    Patient: Desire Florez (63 y.o. 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  Pt reports abdominal cramping due to laxatives but very motivated to participate w/ therapy.  Pt able to participate in core strengthening exercises using std walker in room to provide mechanism for semi supine to long sits, performed from various positions to work anterior, lateral L and R.  Pt able to tolerate B LE AA ROM to improve B hip, B knee flexion and L>R ankle ROM.  Pt able to participate in green t-band exercises for B UE strengthening.  Pt does need A for performing all therapeutic exercises for technique and safety.  Pt very motivated.  Total A for log rolling in bed.  Pt left supine in bed w/ meal tray set up and all needs within reach.  Nurse Esmer aware of session and outcomes.  Recommend SNF rehab upon hospital d/c.  Progression toward goals:   []      Improving appropriately and progressing toward goals  [x]      Improving slowly and progressing toward goals  []      Not making progress toward goals and plan of care will be adjusted     PLAN:  Patient continues to benefit from skilled intervention to address the above impairments.  Continue treatment per 
Physical Therapy Goals:  Initiated 4/12/2024 to be met within 7-10 days.  Short Term Goals  Short Term Goal 1: Patient will perform rolling and supine to/from sit with mod/max assist.  Short Term Goal 2: Patient will demonstrate static sitting balance EOB x5-10 min with CGA/min A for balance/ADL activity.  Short Term Goal 3: Patient to perform sit to/from stand with mod/max  assist and SW in prep for transfer bed to chair.      PHYSICAL THERAPY EVALUATION    Patient: Desire Florez (63 y.o. 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  States pt at rehab following R ankle fusion and amb'd ~25ft at best with SW.  Reports most difficulty is with sit to/from stand due to poor function (ROM/strength) both knees, L hip needing hip replacement, and L LE shorter than R.  States pt at rehab amb'd ~25ft at best with SW.  States he was helping pt to commode when pt not able to support self on LE\"s and he had to lower pt down to commode; EMS then called for assist.  Expresses interest in [facility] near his home at this time.        ASSESSMENT :  Pt is seen in medical unit following admission with diagnosis as noted above.  Pt seen with OT for second set of skilled hands.  Found supine in bed with LE's Er'd and ankles resting in neutral DF position; Pur wick in place.  Pt pleasant and cooperative, pt father present and supportive, providing historical information.  Based on the objective data described below, the patient presents with limitations in ROM bilateral LE's: hip flexion ~30-40deg L <R, knee flexion 15-20 deg  and with R ankle fused, L 
[facility], report given to HARLEY Betancourt.   
SNFs facility are requesting UAI and potential a level II screening. SW used precautionary measures to meet with patient at bedside to obtain information to complete UAI and provide update regarding  and SNFs status. Patient advised her father had stepped away but would return shortly. SW left the [facility] resources at bedside for patient and her father to review. SW obtained the necessary information from patient to complete the UAI. Patient declined the accepting facility [facility]. CHEO updated patient regarding  not having an order for request DME.       4;48 pm - CHEO met with patient was accompanied by her father in room and provided regarding UAI submitted to SNF and awaiting a response. SW also updated family regarding  does not have an DME order. Patient's father advised he research matter on his end. CHEO will continue to follow for the discharge planning and follow up with the SNF facilities.      CASSI Lau  Supervisee in Social Work  Care Management Department    
SPEECH LANGUAGE PATHOLOGY BEDSIDE SWALLOW EVALUATION AND DISCHARGE    Recommendations:   Easy to chew diet with thin liquids  Meds as tolerated  Feeding assistance as needed   Aspiration precautions  HOB >45 degrees during all intake and for at least 30 min after intake  Small bites/sips, Feed slowly, alternating bites/sips   Oral care three times daily       Patient: Desire Florez (63 y.o. 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 Pt was seen for a bedside swallow evaluation. She was repositioned to upright in bed, A&Ox4 w/ father at bedside. PMHx of cerebral palsy. Pt is wheelchair bound. Endorses GERD, which she describes as being poorly controlled. Strength, ROM, and coordination of the orofacial musculature were all found to be reduced d/t CP. The oral cavity was moist and clean w/ intact dentition. Hyolaryngeal excursion appreciated to palpation. Pt tolerated reg solid, puree, and thin liquids +/- straw, single and consecutive swallows, without any overt signs/symptoms of aspiration. Mastication was appropriate with timely a-p transit. Positive oral clearance observed across all trials w/ minimal oral residue on the superior lingual surface which cleared w/ a liquid wash. No evidence of pharyngeal residue, evidenced by phonation, post swallow. Pt safe for upgrade to easy to chew solid diet with thin liquids, aspiration precautions, reflux precautions, oral care TID, and meds as tolerated. Education provided on safe swallow compensatory strategies, including sitting completely upright during all PO intake, taking small bites/sips, and eating and 
SW received a call from ED regarding possible placement/admission. Patient has been in [facility] in [place] for several months and patients father (81) thought that he would be able to manage patient at home, with home health care, but was unable. Patients father stated that he is not able to manage patient at home, due to her ankle and brought her to ED for help. SW talked with patient regarding options, none of which can be done immediately without further eval. Patients father has looked into [employer], as patient has 60K and then will need Medicaid. [facility] has a program that will allow patient to spend down funds and then apply for pace program. CHEO reached out to [place] with [facility] to follow up with patients father to assist with other long term care possibilities as well. Patient being admitted for further work up. CHEO to place local SNF referrals via CC link. CHEO reached out to admitted MD to make her aware of tentative plans in place. Patient has a wheelchair and chair lift at home, that father uses to assist. CHEO to follow.   
TRANSFER - IN REPORT:    Verbal report received from HARLEY Erickson on Desire Florez  being received from ED for routine progression of patient care      Report consisted of patient's Situation, Background, Assessment and   Recommendations(SBAR).     Information from the following report(s) Nurse Handoff Report, ED SBAR, Adult Overview, Intake/Output, MAR, and Recent Results was reviewed with the receiving nurse.    Opportunity for questions and clarification was provided.      2130- Assessment completed upon patient's arrival to unit and care assumed.    Patient A&Ox4, RA. Denies chest pain and SOB. Patient's lung clear bilaterally.  With G20 catheter on Left antecubital, patent and intact.  Denies numbness/tingling/calf pain.  With swelling on LLE. Pt educated on unit routine, q2h rounds, pain management, and \"Up for Meals\". Pt verbalized understanding, no concerns voiced. Call bell and telephone within reach, bed in lowest position. Pt encouraged to call for assistance.     6985- Urine specimen forwarded to laboratory.    3778- Relayed potassium result to Dr. Hutchinson via perfect serve.  Clarified order, awaiting for reply.    3310- Bedside and Verbal shift change report given to HARLEY Arce (oncoming nurse) by HARLEY Flores (offgoing nurse). Report included the following information Nurse Handoff Report, Adult Overview, Intake/Output, MAR, and Recent Results.    
    SUBJECTIVE:   Subjective: Oh yay I love my therapists    OBJECTIVE DATA SUMMARY:   Critical Behavior:  Orientation Level: Oriented to place;Oriented to situation;Oriented to person  WFL  Functional Mobility Training:  Bed Mobility:  Bed Mobility Training  Bed Mobility Training: Yes  Rolling: Maximum assistance  Supine to Sit: Maximum assistance;Assist X2;Total assistance  Sit to Supine: Maximum assistance;Assist X2;Total assistance  Transfers:  Transfer Training  Transfer Training: No  Balance:  Balance  Sitting: Impaired  Sitting - Static: Poor (constant support)  Sitting - Dynamic: Poor (constant support)   Pain:  Pain level pre-treatment: 0/10  Pain level post-treatment: 0/10   Pain Intervention(s): Rest, Ice, Repositioning  Response to intervention: Nurse notified    Activity Tolerance:   Activity Tolerance: Patient tolerated treatment well    After treatment:   [] Patient left in no apparent distress sitting up in chair  [x] Patient left in no apparent distress in bed  [x] Call bell left within reach  [x] Nursing notified  [] Caregiver present  [] Bed alarm activated  [] SCDs applied      COMMUNICATION/EDUCATION:   Patient Education  Education Given To: Patient  Education Provided: Role of Therapy;Plan of Care;Home Exercise Program;Transfer Training;Fall Prevention Strategies  Education Method: Demonstration;Verbal;Teach Back  Barriers to Learning: None  Education Outcome: Verbalized understanding;Demonstrated understanding;Continued education needed      Minutes: 57  Yun Monterroso PT    
Therapeutic Exercises:     Pain:  Pain level pre-treatment: 0/10   Pain level post-treatment: 0/10      Activity Tolerance:    Activity Tolerance: Patient tolerated treatment well  Please refer to the flowsheet for vital signs taken during this treatment.  After treatment:   []  Patient left in no apparent distress sitting up in chair  [x]  Patient left in no apparent distress in bed  [x]  Call bell left within reach  [x]  Nursing notified  [x]  Caregiver present  [x]  Bed alarm activated    COMMUNICATION/EDUCATION:   Patient Education  Education Given To: Patient;Caregiver  Education Provided: Role of Therapy;Plan of Care;Home Exercise Program;ADL Adaptive Strategies  Education Method: Verbal  Barriers to Learning: None  Education Outcome: Continued education needed      Thank you for this referral.  Latha Myles OT  Minutes: 54    [facility] AM-PAC® Daily Activity Inpatient Short Form (6-Clicks)*    How much HELP from another person does the patient currently need…    (If the patient hasn't done an activity recently, how much help from another person do you think he/she would need if he/she tried?)   Total (Total A or Dep)   A Lot  (Mod to Max A)   A Little (Sup or Min A)   None (Mod I to I)   Putting on and taking off regular lower body clothing?   [x] 1 [] 2 [] 3 [] 4   2. Bathing (including washing, rinsing,      drying)?    [x] 1 [] 2 [] 3 [] 4   3. Toileting, which includes using toilet, bedpan or urinal?   [x] 1 [] 2 [] 3 [] 4   4. Putting on and taking off regular upper body clothing?   [] 1 [] 2 [x] 3 [] 4   5. Taking care of personal grooming such as brushing teeth?   [] 1 [] 2 [x] 3 [] 4   6. Eating meals?   [] 1 [] 2 [] 3 [x] 4        
interrogated. The vein demonstrates normal color filling and compressibility. Doppler flow was phasic and spontaneous.     XR HIP 2-3 VW W PELVIS LEFT    Result Date: 4/11/2024  EXAM: XR HIP 2-3 VW W PELVIS LEFT INDICATION: left hip pain. COMPARISON: None. FINDINGS: AP view of the pelvis and a frogleg lateral view of the left hip demonstrate no acute fracture. There is a chronic deformity of the left hip acetabulum with bone remodeling and superior migration of the left femur. The right hip is aligned. The SI joints pubic symphysis are aligned.     Chronic deformity of the left hip acetabulum with bone remodeling and superior migration of the left femur.    XR KNEE LEFT (1-2 VIEWS)    Result Date: 4/11/2024  EXAM: XR KNEE LEFT (1-2 VIEWS) INDICATION: left knee pain. COMPARISON: None. FINDINGS: Two views of the left knee demonstrate no fracture or other acute osseous or articular abnormality. There is no obvious effusion, but lateral view is nondiagnostic due to poor positioning and effusion is not completely excluded..     No obvious acute abnormality technically compromised examination..      CRISTIAN SARGENT MD    
8.6      Platelets:   Lab Results   Component Value Date/Time     04/12/2024 04:00 AM      Last 3 Platelets:   Recent Labs     04/12/24  0400         Hemoglobin/Hematocrit:   Lab Results   Component Value Date/Time    HGB 11.6 04/12/2024 04:00 AM    HCT 35.6 04/12/2024 04:00 AM      Last 3 Hemoglobin/Hematocrit:   Recent Labs     04/11/24 2013 04/12/24  0400   HGB 12.4 11.6*   HCT 37.7 35.6      Hepatic Function Panel:   Lab Results   Component Value Date/Time    ALKPHOS 116 04/11/2024 08:13 PM    ALT 32 04/11/2024 08:13 PM    AST 44 04/11/2024 08:13 PM    PROT 7.6 04/11/2024 08:13 PM    BILITOT 0.7 04/11/2024 08:13 PM    LABALBU 3.7 04/11/2024 08:13 PM      Last 3 Hepatic Function Panel:   Recent Labs     04/11/24 2013   ALKPHOS 116   ALT 32   AST 44*   PROT 7.6   BILITOT 0.7   LABALBU 3.7      Albumin:   Lab Results   Component Value Date/Time    LABALBU 3.7 04/11/2024 08:13 PM      Calcium:   Lab Results   Component Value Date/Time    CALCIUM 9.2 04/12/2024 04:00 AM      Ionized Calcium: No results found for: \"IONCA\"   Magnesium:   Lab Results   Component Value Date/Time    MG 1.9 04/12/2024 04:00 AM      ABGs: No results found for: \"PHART\", \"PO2ART\", \"TWU4QXM\", \"ZQU7FZX\", \"BEART\", \"R7WBVFCI\"   Lactic Acid: No results found for: \"LACTA\"   Amylase: No results found for: \"AMYLASE\"   Last 3 Amylase: No results for input(s): \"AMYLASE\" in the last 72 hours.   Lipase: No results found for: \"LIPASE\"   Last 3 Lipase: No results for input(s): \"LIPASE\" in the last 72 hours.   BNP: No results found for: \"BNP\"   Last 3 BNP: No results for input(s): \"BNP\" in the last 72 hours.   Lipids No results found for: \"CHOL\", \"TRIG\", \"HDL\", \"LDLCALC\", \"CHOLHDLRATIO\"  Cardiac Enzymes: No results found for: \"CKTOTAL\", \"CKMB\", \"CKMBINDEX\", \"TROPONINI\"  Urin analysis:   Recent Labs     04/12/24  0333   COLORU YELLOW   SPECGRAV 1.011   PROTEINU Negative   GLUCOSEU Negative   KETUA TRACE*   BILIRUBINUR Negative   NITRU 
side of addomen, medication fill to pump every 3 months    PONV (postoperative nausea and vomiting)     hx of N&V with general anesthesia    Urethral stricture 2008    Dr. Mcpherson-urologist     Past Surgical History:   Procedure Laterality Date    CHOLECYSTECTOMY  1993    laparoscopic    COLONOSCOPY  03/24/2021    Dr. Mgadaleno at [facility]COPY    CYSTOSCOPY  2019    urethral dilation x6- Dr. Mcpherson    CYSTOSCOPY N/A 06/06/2023    cystoscopy urethral dilation- Dr. Mcpherson    ESOPHAGOGASTRODUODENOSCOPY  01/2021    FOOT SURGERY Right 08/2013    Dr. Subramanian    HIP SURGERY Left 1976    ORTHOPEDIC SURGERY  1966    bilateral heel cords cut    OTHER SURGICAL HISTORY  1966    scissor gait operated on    OTHER SURGICAL HISTORY  2011    Baclofen pump- \"on the right side of stomach\". medication fill every 3 months    POSTERIOR SAGITTAL ANORECTOVAGINAL URETHRAPLASTY       Barriers to Learning/Limitations: physical  Compensate with: visual, verbal, tactile, kinesthetic cues/model    Home Situation:   Social/Functional History  Lives With: Parent  Type of Home: House  Home Layout: Two level, Able to Live on Main level with bedroom/bathroom (sleeps in recliner)  Home Access:  (w/c lift in garage for entry)  Bathroom Shower/Tub:  (roll in shower)  Bathroom Toilet:  (shorter than normal toilet and pt dad reports lifting the seat when used in order to make it even shorter)  Bathroom Equipment: Grab bars around toilet  Bathroom Accessibility: Wheelchair accessible  Receives Help From: Family  Homemaking Responsibilities: No  Ambulation Assistance: Needs assistance  Transfer Assistance: Needs assistance  Active : No  []  Right hand dominant   []  Left hand dominant    Cognitive/Behavioral Status:     WFL                               Skin: dry  Edema: none visualized    Vision/Perceptual:    Vision: Within Functional Limits                          Coordination: BUE  Coordination: Generally decreased, functional            Balance:

## 2024-06-13 ENCOUNTER — ANESTHESIA EVENT (OUTPATIENT)
Facility: HOSPITAL | Age: 64
End: 2024-06-13
Payer: MEDICARE

## 2024-06-20 NOTE — PROGRESS NOTES
Spoke with pt - reviewed meds- do not take Ramipril DOS. Take Omeprazole DOS with sip of water.   sent here for chemo and radiation sent here for chemo and radiation sent here for chemo and radiation sent here for chemo and radiation sent here for chemo and radiation sent here for chemo and radiation sent here for chemo and radiation

## 2024-06-25 ENCOUNTER — HOSPITAL ENCOUNTER (OUTPATIENT)
Facility: HOSPITAL | Age: 64
Setting detail: OUTPATIENT SURGERY
Discharge: HOME | End: 2024-06-25
Attending: UROLOGY | Admitting: UROLOGY
Payer: MEDICARE

## 2024-06-25 ENCOUNTER — APPOINTMENT (OUTPATIENT)
Facility: HOSPITAL | Age: 64
End: 2024-06-25
Attending: UROLOGY
Payer: MEDICARE

## 2024-06-25 ENCOUNTER — ANESTHESIA (OUTPATIENT)
Facility: HOSPITAL | Age: 64
End: 2024-06-25
Payer: MEDICARE

## 2024-06-25 VITALS
OXYGEN SATURATION: 97 % | RESPIRATION RATE: 15 BRPM | DIASTOLIC BLOOD PRESSURE: 76 MMHG | TEMPERATURE: 97.5 F | SYSTOLIC BLOOD PRESSURE: 147 MMHG | HEART RATE: 91 BPM

## 2024-06-25 PROCEDURE — 6360000002 HC RX W HCPCS: Performed by: ANESTHESIOLOGY

## 2024-06-25 PROCEDURE — 6360000002 HC RX W HCPCS: Performed by: NURSE ANESTHETIST, CERTIFIED REGISTERED

## 2024-06-25 PROCEDURE — 3600000012 HC SURGERY LEVEL 2 ADDTL 15MIN: Performed by: UROLOGY

## 2024-06-25 PROCEDURE — C1769 GUIDE WIRE: HCPCS | Performed by: UROLOGY

## 2024-06-25 PROCEDURE — 6370000000 HC RX 637 (ALT 250 FOR IP): Performed by: ANESTHESIOLOGY

## 2024-06-25 PROCEDURE — 6360000004 HC RX CONTRAST MEDICATION: Performed by: UROLOGY

## 2024-06-25 PROCEDURE — 3700000001 HC ADD 15 MINUTES (ANESTHESIA): Performed by: UROLOGY

## 2024-06-25 PROCEDURE — 3700000000 HC ANESTHESIA ATTENDED CARE: Performed by: UROLOGY

## 2024-06-25 PROCEDURE — 2500000003 HC RX 250 WO HCPCS: Performed by: NURSE ANESTHETIST, CERTIFIED REGISTERED

## 2024-06-25 PROCEDURE — 2580000003 HC RX 258: Performed by: UROLOGY

## 2024-06-25 PROCEDURE — 7100000001 HC PACU RECOVERY - ADDTL 15 MIN: Performed by: UROLOGY

## 2024-06-25 PROCEDURE — 93005 ELECTROCARDIOGRAM TRACING: CPT | Performed by: UROLOGY

## 2024-06-25 PROCEDURE — 3600000002 HC SURGERY LEVEL 2 BASE: Performed by: UROLOGY

## 2024-06-25 PROCEDURE — 7100000000 HC PACU RECOVERY - FIRST 15 MIN: Performed by: UROLOGY

## 2024-06-25 PROCEDURE — 6360000002 HC RX W HCPCS: Performed by: UROLOGY

## 2024-06-25 PROCEDURE — 7100000010 HC PHASE II RECOVERY - FIRST 15 MIN: Performed by: UROLOGY

## 2024-06-25 PROCEDURE — 7100000011 HC PHASE II RECOVERY - ADDTL 15 MIN: Performed by: UROLOGY

## 2024-06-25 PROCEDURE — 2709999900 HC NON-CHARGEABLE SUPPLY: Performed by: UROLOGY

## 2024-06-25 PROCEDURE — C1726 CATH, BAL DIL, NON-VASCULAR: HCPCS | Performed by: UROLOGY

## 2024-06-25 RX ORDER — HYDROMORPHONE HYDROCHLORIDE 1 MG/ML
0.5 INJECTION, SOLUTION INTRAMUSCULAR; INTRAVENOUS; SUBCUTANEOUS EVERY 5 MIN PRN
Status: DISCONTINUED | OUTPATIENT
Start: 2024-06-25 | End: 2024-06-25 | Stop reason: HOSPADM

## 2024-06-25 RX ORDER — METOCLOPRAMIDE HYDROCHLORIDE 5 MG/ML
10 INJECTION INTRAMUSCULAR; INTRAVENOUS
Status: DISCONTINUED | OUTPATIENT
Start: 2024-06-25 | End: 2024-06-25 | Stop reason: HOSPADM

## 2024-06-25 RX ORDER — FENTANYL CITRATE 50 UG/ML
INJECTION, SOLUTION INTRAMUSCULAR; INTRAVENOUS PRN
Status: DISCONTINUED | OUTPATIENT
Start: 2024-06-25 | End: 2024-06-25 | Stop reason: SDUPTHER

## 2024-06-25 RX ORDER — ROCURONIUM BROMIDE 10 MG/ML
INJECTION, SOLUTION INTRAVENOUS PRN
Status: DISCONTINUED | OUTPATIENT
Start: 2024-06-25 | End: 2024-06-25 | Stop reason: SDUPTHER

## 2024-06-25 RX ORDER — SODIUM CHLORIDE 0.9 % (FLUSH) 0.9 %
5-40 SYRINGE (ML) INJECTION PRN
Status: DISCONTINUED | OUTPATIENT
Start: 2024-06-25 | End: 2024-06-25 | Stop reason: HOSPADM

## 2024-06-25 RX ORDER — GABAPENTIN 100 MG/1
100 CAPSULE ORAL 2 TIMES DAILY
COMMUNITY

## 2024-06-25 RX ORDER — SODIUM CHLORIDE 9 MG/ML
INJECTION, SOLUTION INTRAVENOUS PRN
Status: DISCONTINUED | OUTPATIENT
Start: 2024-06-25 | End: 2024-06-25 | Stop reason: HOSPADM

## 2024-06-25 RX ORDER — MIDAZOLAM HYDROCHLORIDE 1 MG/ML
INJECTION INTRAMUSCULAR; INTRAVENOUS PRN
Status: DISCONTINUED | OUTPATIENT
Start: 2024-06-25 | End: 2024-06-25 | Stop reason: SDUPTHER

## 2024-06-25 RX ORDER — ONDANSETRON 2 MG/ML
INJECTION INTRAMUSCULAR; INTRAVENOUS PRN
Status: DISCONTINUED | OUTPATIENT
Start: 2024-06-25 | End: 2024-06-25 | Stop reason: SDUPTHER

## 2024-06-25 RX ORDER — IPRATROPIUM BROMIDE AND ALBUTEROL SULFATE 2.5; .5 MG/3ML; MG/3ML
1 SOLUTION RESPIRATORY (INHALATION)
Status: DISCONTINUED | OUTPATIENT
Start: 2024-06-25 | End: 2024-06-25 | Stop reason: HOSPADM

## 2024-06-25 RX ORDER — DEXAMETHASONE SODIUM PHOSPHATE 4 MG/ML
INJECTION, SOLUTION INTRA-ARTICULAR; INTRALESIONAL; INTRAMUSCULAR; INTRAVENOUS; SOFT TISSUE PRN
Status: DISCONTINUED | OUTPATIENT
Start: 2024-06-25 | End: 2024-06-25 | Stop reason: SDUPTHER

## 2024-06-25 RX ORDER — FENTANYL CITRATE 50 UG/ML
25 INJECTION, SOLUTION INTRAMUSCULAR; INTRAVENOUS EVERY 5 MIN PRN
Status: COMPLETED | OUTPATIENT
Start: 2024-06-25 | End: 2024-06-25

## 2024-06-25 RX ORDER — PROPOFOL 10 MG/ML
INJECTION, EMULSION INTRAVENOUS PRN
Status: DISCONTINUED | OUTPATIENT
Start: 2024-06-25 | End: 2024-06-25 | Stop reason: SDUPTHER

## 2024-06-25 RX ORDER — EPHEDRINE SULFATE 5 MG/ML
INJECTION INTRAVENOUS PRN
Status: DISCONTINUED | OUTPATIENT
Start: 2024-06-25 | End: 2024-06-25 | Stop reason: SDUPTHER

## 2024-06-25 RX ORDER — NALOXONE HYDROCHLORIDE 0.4 MG/ML
INJECTION, SOLUTION INTRAMUSCULAR; INTRAVENOUS; SUBCUTANEOUS PRN
Status: DISCONTINUED | OUTPATIENT
Start: 2024-06-25 | End: 2024-06-25 | Stop reason: HOSPADM

## 2024-06-25 RX ORDER — LIDOCAINE HYDROCHLORIDE 20 MG/ML
INJECTION, SOLUTION EPIDURAL; INFILTRATION; INTRACAUDAL; PERINEURAL PRN
Status: DISCONTINUED | OUTPATIENT
Start: 2024-06-25 | End: 2024-06-25 | Stop reason: SDUPTHER

## 2024-06-25 RX ORDER — SODIUM CHLORIDE, SODIUM LACTATE, POTASSIUM CHLORIDE, CALCIUM CHLORIDE 600; 310; 30; 20 MG/100ML; MG/100ML; MG/100ML; MG/100ML
INJECTION, SOLUTION INTRAVENOUS CONTINUOUS
Status: DISCONTINUED | OUTPATIENT
Start: 2024-06-25 | End: 2024-06-25 | Stop reason: HOSPADM

## 2024-06-25 RX ORDER — SODIUM CHLORIDE 0.9 % (FLUSH) 0.9 %
5-40 SYRINGE (ML) INJECTION EVERY 12 HOURS SCHEDULED
Status: DISCONTINUED | OUTPATIENT
Start: 2024-06-25 | End: 2024-06-25 | Stop reason: HOSPADM

## 2024-06-25 RX ORDER — CIPROFLOXACIN 500 MG/1
500 TABLET, FILM COATED ORAL 2 TIMES DAILY
Qty: 6 TABLET | Refills: 0 | Status: SHIPPED | OUTPATIENT
Start: 2024-06-25 | End: 2024-06-28

## 2024-06-25 RX ORDER — SCOLOPAMINE TRANSDERMAL SYSTEM 1 MG/1
1 PATCH, EXTENDED RELEASE TRANSDERMAL ONCE
Status: DISCONTINUED | OUTPATIENT
Start: 2024-06-25 | End: 2024-06-25 | Stop reason: HOSPADM

## 2024-06-25 RX ADMIN — SUGAMMADEX 200 MG: 100 INJECTION, SOLUTION INTRAVENOUS at 14:15

## 2024-06-25 RX ADMIN — WATER 2000 MG: 1 INJECTION INTRAMUSCULAR; INTRAVENOUS; SUBCUTANEOUS at 14:01

## 2024-06-25 RX ADMIN — DEXAMETHASONE SODIUM PHOSPHATE 4 MG: 4 INJECTION INTRA-ARTICULAR; INTRALESIONAL; INTRAMUSCULAR; INTRAVENOUS; SOFT TISSUE at 14:08

## 2024-06-25 RX ADMIN — ONDANSETRON HYDROCHLORIDE 4 MG: 2 INJECTION INTRAMUSCULAR; INTRAVENOUS at 14:08

## 2024-06-25 RX ADMIN — ROCURONIUM BROMIDE 30 MG: 10 INJECTION, SOLUTION INTRAVENOUS at 13:58

## 2024-06-25 RX ADMIN — FENTANYL CITRATE 50 MCG: 50 INJECTION, SOLUTION INTRAMUSCULAR; INTRAVENOUS at 13:54

## 2024-06-25 RX ADMIN — MIDAZOLAM 2 MG: 1 INJECTION INTRAMUSCULAR; INTRAVENOUS at 13:52

## 2024-06-25 RX ADMIN — EPHEDRINE SULFATE 10 MG: 5 INJECTION INTRAVENOUS at 14:05

## 2024-06-25 RX ADMIN — SODIUM CHLORIDE, SODIUM LACTATE, POTASSIUM CHLORIDE, AND CALCIUM CHLORIDE: 600; 310; 30; 20 INJECTION, SOLUTION INTRAVENOUS at 12:19

## 2024-06-25 RX ADMIN — PROPOFOL 170 MG: 10 INJECTION, EMULSION INTRAVENOUS at 13:58

## 2024-06-25 RX ADMIN — FENTANYL CITRATE 25 MCG: 50 INJECTION INTRAMUSCULAR; INTRAVENOUS at 14:49

## 2024-06-25 RX ADMIN — FENTANYL CITRATE 25 MCG: 50 INJECTION INTRAMUSCULAR; INTRAVENOUS at 14:42

## 2024-06-25 RX ADMIN — LIDOCAINE HYDROCHLORIDE 60 MG: 20 INJECTION, SOLUTION EPIDURAL; INFILTRATION; INTRACAUDAL; PERINEURAL at 13:58

## 2024-06-25 RX ADMIN — SODIUM CHLORIDE, SODIUM LACTATE, POTASSIUM CHLORIDE, AND CALCIUM CHLORIDE: 600; 310; 30; 20 INJECTION, SOLUTION INTRAVENOUS at 14:28

## 2024-06-25 ASSESSMENT — PAIN DESCRIPTION - FREQUENCY
FREQUENCY: CONTINUOUS

## 2024-06-25 ASSESSMENT — PAIN DESCRIPTION - ONSET
ONSET: ON-GOING

## 2024-06-25 ASSESSMENT — PAIN - FUNCTIONAL ASSESSMENT
PAIN_FUNCTIONAL_ASSESSMENT: ACTIVITIES ARE NOT PREVENTED

## 2024-06-25 ASSESSMENT — PAIN DESCRIPTION - DESCRIPTORS
DESCRIPTORS: BURNING

## 2024-06-25 ASSESSMENT — PAIN DESCRIPTION - PAIN TYPE
TYPE: SURGICAL PAIN

## 2024-06-25 ASSESSMENT — LIFESTYLE VARIABLES: SMOKING_STATUS: 0

## 2024-06-25 ASSESSMENT — PAIN SCALES - GENERAL
PAINLEVEL_OUTOF10: 0
PAINLEVEL_OUTOF10: 5
PAINLEVEL_OUTOF10: 5
PAINLEVEL_OUTOF10: 4

## 2024-06-25 ASSESSMENT — PAIN DESCRIPTION - LOCATION
LOCATION: OTHER (COMMENT)

## 2024-06-25 NOTE — PERIOP NOTE
Brief placed on pt. Pt placed in wheelchair. Discharge instructions explained. Questions addressed. Prescriptions picked up from pharmacy. Waiting on transport.

## 2024-06-25 NOTE — PERIOP NOTE
Handoff report given to HARLEY Parra at [facility] [place].

## 2024-06-25 NOTE — DISCHARGE INSTRUCTIONS
DISCHARGE SUMMARY from Nurse    PATIENT INSTRUCTIONS:    After general anesthesia or intravenous sedation, for 24 hours or while taking prescription Narcotics:  Limit your activities  Do not drive and operate hazardous machinery  Do not make important personal or business decisions  Do  not drink alcoholic beverages  If you have not urinated within 8 hours after discharge, please contact your surgeon on call.    Report the following to your surgeon:  Excessive pain, swelling, redness or odor of or around the surgical area  Temperature over 100.5  Nausea and vomiting lasting longer than 4 hours or if unable to take medications  Any signs of decreased circulation or nerve impairment to extremity: change in color, persistent  numbness, tingling, coldness or increase pain  Any questions    What to do at Home:  Recommended activity: activity as tolerated    *  Please give a list of your current medications to your Primary Care Provider.    *  Please update this list whenever your medications are discontinued, doses are      changed, or new medications (including over-the-counter products) are added.    *  Please carry medication information at all times in case of emergency situations.    These are general instructions for a healthy lifestyle:    No smoking/ No tobacco products/ Avoid exposure to second hand smoke  Surgeon General's Warning:  Quitting smoking now greatly reduces serious risk to your health.    Obesity, smoking, and sedentary lifestyle greatly increases your risk for illness    A healthy diet, regular physical exercise & weight monitoring are important for maintaining a healthy lifestyle    You may be retaining fluid if you have a history of heart failure or if you experience any of the following symptoms:  Weight gain of 3 pounds or more overnight or 5 pounds in a week, increased swelling in our hands or feet or shortness of breath while lying flat in bed.  Please call your doctor as soon as you notice

## 2024-06-25 NOTE — PERIOP NOTE
This RN noticed some bruising on the pt's anterior and posterior portion of the R upper arm when removing blood pressure cuff. Pt states the bruising was not present prior to the blood pressure cuff being put on.

## 2024-06-25 NOTE — OP NOTE
Operative Note      Patient: Desire Florez  YOB: 1960  MRN: [id number]    Date of Procedure: 6/25/2024    Pre-Op Diagnosis Codes:     * Other urethral stricture, female [N35.82]    Post-Op Diagnosis: Same       Procedure(s):  CYSTOSCOPY , URETHRAL DILATION WITH C-ARM    Surgeon(s):  Tre Mcpherson MD    Assistant:   * No surgical staff found *    Anesthesia: GENERAL    Estimated Blood Loss (mL): Minimal    Complications: None    Specimens:   * No specimens in log *    Implants:  * No implants in log *      Drains: * No LDAs found *    Findings:  Infection Present At Time Of Surgery (PATOS) (choose all levels that have infection present):  No infection present  Other Findings: TIGHT URETRHA -- BUT NOT AS TIGHT AS IT HAS BEEN IN THE PAST.  GRADE 2 BLADDER TRABECULATIONS.       CLINICAL NOTE:  The patient is a 63-year-old female who had a urethral dilation for very tight urethra: dilated in 2014, 2019, and most recently 6/6/2023.  She has had worsening of her stream and retaining more urine on bladder scan.  She presents for repeat dilation.     PROCEDURE:  Preoperatively, risks and benefits of surgery were described to the patient where the risks include but are not limited to bleeding, infection, injury to the bladder, injury to the urethra, and possible need for future procedures.  The patient understood the risks and signed the informed consent.  The patient was taken to the operating room and placed on the OR table in supine position.  She was administered a general anesthetic.  She was placed in dorsal lithotomy position, prepped and draped in the usual sterile manner.  I inserted a 22-Persian cystoscope through a very tight urethra using a 30-degree lens.  There were grade II bladder trabeculations.  There were no stones, no tumors, no areas of concern within the bladder.   Then I passed a sensor wire through the scope into the bladder confirmed by fluoroscopy.  I then balloon dilated the

## 2024-06-25 NOTE — PERIOP NOTE
Reviewed PTA medication list with patient/caregiver and patient/caregiver denies any additional medications.     Patient admits to having a responsible adult care for them at home for at least 24 hours after surgery.    Patient encouraged to use gown warming system and informed that using said warming gown to regulate body temperature prior to a procedure has been shown to help reduce the risks of blood clots and infection.    Patient's pharmacy of choice verified and documented in PTA medication section.    Dual skin assessment & fall risk band verification completed with A Bairon SOUZA.

## 2024-06-25 NOTE — PERIOP NOTE
TRANSFER - OUT REPORT:    Verbal report given to HARLEY Allan on Desire Florez  being transferred to Phase II for routine progression of patient care       Report consisted of patient's Situation, Background, Assessment and   Recommendations(SBAR).     Information from the following report(s) Adult Overview, Surgery Report, Intake/Output, and MAR was reviewed with the receiving nurse.           Lines:   Peripheral IV 06/25/24 Left;Posterior Forearm (Active)   Site Assessment Clean, dry & intact 06/25/24 1458   Line Status Infusing 06/25/24 1458   Line Care Connections checked and tightened 06/25/24 1458   Phlebitis Assessment No symptoms 06/25/24 1458   Infiltration Assessment 0 06/25/24 1458   Alcohol Cap Used No 06/25/24 1458   Dressing Status Clean, dry & intact 06/25/24 1458   Dressing Type Transparent 06/25/24 1458        Opportunity for questions and clarification was provided.      Patient transported with:  Registered Nurse

## 2024-06-25 NOTE — H&P
[facility]  [address]  [facility] [address]  Tel:  [phone]  Fax: [phone]    Patient : Desire Florez   YOB: 1960               Assessment/Plan  # Detail Type Description    1. Assessment Other urethral stricture, female (N35.82).    Patient Plan Voids better after urethral dilations    Surgery requested for  Urethral dilation         2. Assessment Urinary tract infection, site not specified (N39.0).    Patient Plan Recent urinary tract infections         3. Assessment Feeling of incomplete bladder emptying (R39.14).    Patient Plan Unable to feel large volumes of urine at times         4. Assessment Urgency of urination (R39.15).    Patient Plan No urgency  Urinates in depends with mild sensation of urination only         5. Assessment Immobility (Z74.09).    Patient Plan She has issues with immobility.  This certainly impacts her voiding issues  She is now unable to use her walker therefore she only transfers with assistance and is wheelchair-bound         6. Assessment Functional incontinence (R39.81).    Patient Plan Functional incontinence  Functional incontinence due to inability to walk, wheelchair-bound only.  Has to use a depends if she ever feels any slight urgency to urinate.    Reports that urinary tract infections sign is burning with urination but this could also be due to vaginal irritation from depends.    Patient is total care at this point and can not move on her own, needs a Crystal lift to transfer.  Order written for patient to have a cath urine sample done at the nursing home  Patient needs a clean cath sample prior to OR schedule of urethral dilations              Additional Visit Information    This 63 year old patient presents for Urinary Retention.    History of Present Illness  1.  Urinary Retention   Onset was 8 years ago. Severity level is no pain. It occurs daily. The problem is improving. Associated symptoms include incomplete

## 2024-06-25 NOTE — ANESTHESIA PRE PROCEDURE
sterile water 20 mL IV syringe  2,000 mg IntraVENous On Call to OR Tre Mcpherson MD       • scopolamine (TRANSDERM-SCOP) transdermal patch 1 patch  1 patch TransDERmal Once Aleks Pizarro MD           Allergies:    Allergies   Allergen Reactions   • Codeine Other (See Comments)     Sleep walking, safety   • Orange Juice Nausea Only   • Terbinafine Nausea And Vomiting     Lamasil       Problem List:    Patient Active Problem List   Diagnosis Code   • Morbid obesity ([facility]) E66.01   • Urethral stricture N35.919   • Cerebral palsy ([facility]) G80.9   • Osteoarthritis of ankle or foot M19.079   • Ambulatory dysfunction R26.2   • BMI 34.0-34.9,adult Z68.34   • HTN (hypertension) I10   • GERD (gastroesophageal reflux disease) K21.9   • UTI due to extended-spectrum beta lactamase (ESBL) producing Escherichia coli N39.0, B96.29, Z16.12       Past Medical History:        Diagnosis Date   • Anxiety 2003    Dr. East-psychiatrist   • Arthritis     right foot   • Cerebral palsy ([facility]) 1962    wheelchair dependent   • Chronic pain     bilateral feet- Dr. Tripp at [facility] will have right ankle surgery [place],15, 2023   • Exercise tolerance finding 06/02/2023    pt is wheelchair dependent. pt denies SOB/chest pain.   • GERD (gastroesophageal reflux disease)    • Hypertension 2013    PCP- Dr. Al Nielsen   • IBS (irritable bowel syndrome) 1993    GI- Dr. Magdaleno   • Pain management     Dr. Starr-pain management for baclofen pump implanted to right side of addomen, medication fill to pump every 3 months   • PONV (postoperative nausea and vomiting)     hx of N&V with general anesthesia   • Urethral stricture 2008    Dr. Mcpherson-urologist       Past Surgical History:        Procedure Laterality Date   • ANKLE FUSION Right 2023   • CHOLECYSTECTOMY  1993    laparoscopic   • COLONOSCOPY  03/24/2021    Dr. Magdaleno at [facility] ENDOSCOPY   • CYSTOSCOPY  2019    urethral dilation x6- Dr. Mcpherson   • CYSTOSCOPY N/A 06/06/2023    cystoscopy urethral

## 2024-06-25 NOTE — PERIOP NOTE
TRANSFER - IN REPORT:    Verbal report received from OR, RN on Desire Florez  being received from OR for routine progression of patient care      Report consisted of patient's Situation, Background, Assessment and   Recommendations(SBAR).     Information from the following report(s) Adult Overview, Surgery Report, Intake/Output, and MAR was reviewed with the receiving nurse.    Opportunity for questions and clarification was provided.      Assessment completed upon patient's arrival to unit and care assumed.

## 2024-06-26 LAB
EKG ATRIAL RATE: 62 BPM
EKG DIAGNOSIS: NORMAL
EKG P AXIS: 25 DEGREES
EKG P-R INTERVAL: 156 MS
EKG Q-T INTERVAL: 430 MS
EKG QRS DURATION: 76 MS
EKG QTC CALCULATION (BAZETT): 436 MS
EKG R AXIS: 33 DEGREES
EKG T AXIS: 5 DEGREES
EKG VENTRICULAR RATE: 62 BPM

## 2025-06-16 ENCOUNTER — HOSPITAL ENCOUNTER (OUTPATIENT)
Facility: HOSPITAL | Age: 65
Setting detail: OUTPATIENT SURGERY
Discharge: NURSING, REHAB OR LONG-TERM CARE | End: 2025-06-16
Attending: INTERNAL MEDICINE | Admitting: INTERNAL MEDICINE
Payer: MEDICARE

## 2025-06-16 VITALS
SYSTOLIC BLOOD PRESSURE: 147 MMHG | DIASTOLIC BLOOD PRESSURE: 72 MMHG | HEIGHT: 56 IN | WEIGHT: 153 LBS | RESPIRATION RATE: 15 BRPM | HEART RATE: 61 BPM | TEMPERATURE: 98 F | BODY MASS INDEX: 34.42 KG/M2 | OXYGEN SATURATION: 99 %

## 2025-06-16 PROCEDURE — 7100000010 HC PHASE II RECOVERY - FIRST 15 MIN: Performed by: INTERNAL MEDICINE

## 2025-06-16 PROCEDURE — 6360000002 HC RX W HCPCS: Performed by: INTERNAL MEDICINE

## 2025-06-16 PROCEDURE — 2580000003 HC RX 258: Performed by: INTERNAL MEDICINE

## 2025-06-16 PROCEDURE — 99152 MOD SED SAME PHYS/QHP 5/>YRS: CPT | Performed by: INTERNAL MEDICINE

## 2025-06-16 PROCEDURE — 7100000011 HC PHASE II RECOVERY - ADDTL 15 MIN: Performed by: INTERNAL MEDICINE

## 2025-06-16 PROCEDURE — 3600007502: Performed by: INTERNAL MEDICINE

## 2025-06-16 PROCEDURE — 3600007512: Performed by: INTERNAL MEDICINE

## 2025-06-16 PROCEDURE — 2709999900 HC NON-CHARGEABLE SUPPLY: Performed by: INTERNAL MEDICINE

## 2025-06-16 RX ORDER — SODIUM PHOSPHATE, DIBASIC AND SODIUM PHOSPHATE, MONOBASIC 7; 19 G/230ML; G/230ML
1 ENEMA RECTAL
COMMUNITY

## 2025-06-16 RX ORDER — CETIRIZINE HYDROCHLORIDE 10 MG/1
10 TABLET ORAL DAILY
COMMUNITY

## 2025-06-16 RX ORDER — BISACODYL 10 MG
10 SUPPOSITORY, RECTAL RECTAL DAILY
COMMUNITY

## 2025-06-16 RX ORDER — ONDANSETRON 4 MG/1
4 TABLET, FILM COATED ORAL EVERY 8 HOURS PRN
COMMUNITY

## 2025-06-16 RX ORDER — SIMVASTATIN 40 MG
40 TABLET ORAL NIGHTLY
COMMUNITY

## 2025-06-16 RX ORDER — SACCHAROMYCES BOULARDII 250 MG
250 CAPSULE ORAL DAILY
COMMUNITY

## 2025-06-16 RX ORDER — CITALOPRAM HYDROBROMIDE 20 MG/1
20 TABLET ORAL DAILY
COMMUNITY

## 2025-06-16 RX ORDER — FAMOTIDINE 20 MG/1
20 TABLET, FILM COATED ORAL DAILY
COMMUNITY

## 2025-06-16 RX ORDER — FLUTICASONE PROPIONATE 50 MCG
1 SPRAY, SUSPENSION (ML) NASAL DAILY PRN
COMMUNITY

## 2025-06-16 RX ORDER — FUROSEMIDE 20 MG/1
20 TABLET ORAL DAILY
COMMUNITY

## 2025-06-16 RX ORDER — CODEINE PHOSPHATE AND GUAIFENESIN 10; 100 MG/5ML; MG/5ML
5 SOLUTION ORAL 3 TIMES DAILY PRN
COMMUNITY

## 2025-06-16 RX ORDER — DIPHENHYDRAMINE HYDROCHLORIDE 50 MG/ML
INJECTION, SOLUTION INTRAMUSCULAR; INTRAVENOUS PRN
Status: DISCONTINUED | OUTPATIENT
Start: 2025-06-16 | End: 2025-06-16 | Stop reason: ALTCHOICE

## 2025-06-16 RX ORDER — DICYCLOMINE HCL 20 MG
20 TABLET ORAL 4 TIMES DAILY PRN
COMMUNITY

## 2025-06-16 RX ORDER — FENTANYL CITRATE 50 UG/ML
INJECTION, SOLUTION INTRAMUSCULAR; INTRAVENOUS PRN
Status: DISCONTINUED | OUTPATIENT
Start: 2025-06-16 | End: 2025-06-16 | Stop reason: ALTCHOICE

## 2025-06-16 RX ORDER — LUBIPROSTONE 24 UG/1
24 CAPSULE ORAL 2 TIMES DAILY WITH MEALS
COMMUNITY

## 2025-06-16 RX ORDER — SIMETHICONE 80 MG
80 TABLET,CHEWABLE ORAL EVERY 6 HOURS PRN
COMMUNITY

## 2025-06-16 RX ORDER — HYDROXYZINE HYDROCHLORIDE 10 MG/1
10 TABLET, FILM COATED ORAL 3 TIMES DAILY PRN
COMMUNITY

## 2025-06-16 RX ORDER — MIDAZOLAM HYDROCHLORIDE 1 MG/ML
INJECTION, SOLUTION INTRAMUSCULAR; INTRAVENOUS PRN
Status: DISCONTINUED | OUTPATIENT
Start: 2025-06-16 | End: 2025-06-16 | Stop reason: ALTCHOICE

## 2025-06-16 RX ORDER — SODIUM CHLORIDE 9 MG/ML
INJECTION, SOLUTION INTRAVENOUS CONTINUOUS
Status: DISCONTINUED | OUTPATIENT
Start: 2025-06-16 | End: 2025-06-16 | Stop reason: HOSPADM

## 2025-06-16 RX ADMIN — SODIUM CHLORIDE: 0.9 INJECTION, SOLUTION INTRAVENOUS at 14:48

## 2025-06-16 ASSESSMENT — PAIN SCALES - GENERAL
PAINLEVEL_OUTOF10: 0
PAINLEVEL_OUTOF10: 0

## 2025-06-16 ASSESSMENT — PAIN - FUNCTIONAL ASSESSMENT
PAIN_FUNCTIONAL_ASSESSMENT: 0-10
PAIN_FUNCTIONAL_ASSESSMENT: ADULT NONVERBAL PAIN SCALE (NPVS)
PAIN_FUNCTIONAL_ASSESSMENT: 0-10
PAIN_FUNCTIONAL_ASSESSMENT: 0-10
PAIN_FUNCTIONAL_ASSESSMENT: ADULT NONVERBAL PAIN SCALE (NPVS)

## 2025-06-16 ASSESSMENT — PAIN DESCRIPTION - DESCRIPTORS: DESCRIPTORS: ACHING

## 2025-06-16 NOTE — DISCHARGE INSTRUCTIONS
Desire Florez  [id number]  1960    EGD DISCHARGE INSTRUCTIONS  Discomfort:  Sore throat- throat lozenges or warm salt water gargle  redness at IV site- apply warm compress to area; if redness or soreness persist- contact your physician  Gaseous discomfort- walking, belching will help relieve any discomfort  You may not operate a vehicle until the next day  You may not engage in an occupation involving machinery or appliances until the next day  You may not drink alcoholic beverages until the next day  Avoid making any critical decisions for at least 24 hour    DIET   You may not resume your regular diet. Antireflux diet.    ACTIVITY  You may not resume your normal daily activities   Spend the remainder of the day resting -  avoid any strenuous activity.    CALL M.D.  ANY SIGN OF   Increasing pain, nausea, vomiting  Abdominal distension (swelling)  New increased bleeding (oral or rectal)  Fever (chills)  Pain in chest area  Bloody discharge from nose or mouth  Shortness of breath     You may not take any Advil, Aspirin, Ibuprofen, Motrin, Aleve, or Goody’s for {NUMBERS:73434} days, preferably ONLY  Tylenol as needed for pain.    Follow-up Instructions:   Follow-up in the office as scheduled or make a follow-up appointment in 2 weeks.     Evangelina Magdaleno MD  June 16, 2025      DISCHARGE SUMMARY from Nurse    PATIENT INSTRUCTIONS:    After general anesthesia or intravenous sedation, for 24 hours or while taking prescription Narcotics:  Limit your activities  Do not drive and operate hazardous machinery  Do not make important personal or business decisions  Do  not drink alcoholic beverages  If you have not urinated within 8 hours after discharge, please contact your surgeon on call.    Report the following to your surgeon:  Excessive pain, swelling, redness or odor of or around the surgical area  Temperature over 100.5  Nausea and vomiting lasting longer than 4 hours or if unable to take medications  Any

## 2025-06-16 NOTE — PRE SEDATION
Sedation Pre-Procedure Note    Patient Name: Desire Florez   YOB: 1960  Room/Bed: ENDO/PL  Medical Record Number: [id number]  Date: 6/16/2025   Time: 2:51 PM       Indication:  chronic GERD dysphagia    Consent: I have discussed with the patient and/or the patient representative the indication, alternatives, and the possible risks and/or complications of the planned procedure and the anesthesia methods. The patient and/or patient representative appear to understand and agree to proceed.    Vital Signs:   Vitals:    06/16/25 1337   BP: 133/61   Pulse: 57   Resp: 15   Temp: 97.3 °F (36.3 °C)   SpO2: 99%       Past Medical History:   has a past medical history of Anxiety, Anxiety, Arthritis, Cerebral palsy (HCC), Cerebral palsy (HCC), Chronic pain, Constipation, Depression, E coli bacteremia, ESBL (extended spectrum beta-lactamase) producing bacteria infection, Exercise tolerance finding, GERD (gastroesophageal reflux disease), Hyperlipidemia, Hypertension, IBS (irritable bowel syndrome), Muscle weakness, Neuralgia and neuritis, Pain management, PONV (postoperative nausea and vomiting), and Urethral stricture.    Past Surgical History:   has a past surgical history that includes Colonoscopy (03/24/2021); other surgical history (1966); orthopedic surgery (1966); Cholecystectomy (1993); other surgical history (2011); Foot surgery (Right, 08/2013); hip surgery (Left, 1976); Cystoscopy (2019); Posterior sagittal anorectovaginal urethraplasty; Esophagogastroduodenoscopy (01/2021); Cystoscopy (N/A, 06/06/2023); Ankle Fusion (Right, 2023); Intrathecal pump implantation; and Cystoscopy (N/A, 6/25/2024).    Medications:   Scheduled Meds:   Continuous Infusions:    sodium chloride 100 mL/hr at 06/16/25 1448     PRN Meds:   Home Meds:   Prior to Admission medications    Medication Sig Start Date End Date Taking? Authorizing Provider   citalopram (CELEXA) 20 MG tablet Take 1 tablet by mouth daily   Yes Provider,

## 2025-06-16 NOTE — PROCEDURES
endoscopy.     Specimen: none           Complications:   None    EBL:  Nil.  IMPLANTS: * No implants in log *  IMPRESSION: mild diffuse esophageal dysmotility disorder without a dominant spasm. There is mild non stenotic scarring in the distal oesophagus are normal. The Z-Line is slightly irregular < 1 cm. No evidence of esophagitis, Delvalle's or erosion 2.5 cm Hiatal hernia. Diaphragmatic opening or notch is located at 37 cm. No evidence of blood, fluid or solid food retention. This time there are only few small to medium sized benign gastric polyps in the proximal stomach 4 to 7 mm, not removed. There is non specific mild diffuse gastric mucosal edema. Biopsy not taken           RECOMMENDATION:  May resume antireflux diet. Avoid dense meat and NSAID's. Keep the head of the bed elevated with a foam wedge.Continue taking Famotidine 20 mg once or twice daily but may have to add the Omeprazole 20 mg as needed if still having heartburns. To dilate as needed only.    Assistant: None    --Evangelina Magdaleno MD on 6/16/2025 at 1:44 PM

## 2025-06-16 NOTE — PERIOP NOTE
Reviewed PTA medication list COPIED FROM [facility] . Patient admits to having a responsible adult care for them for at least 24 hours after surgery.

## 2025-06-16 NOTE — H&P
Assessment/Plan  # Detail Type Description    1. Assessment GERD w/o esophagitis (K21.9).    Impression 12/3/2024:  Underlying CIC, needs better bowel regimen.  Cramping, bloating, heartburn/reflux  (Improving now)  BM: Once ever other day  Now off the Amitiza, needs printed Rx to restart  she is bed ridden or in wheel chair. now living in nursing home  She has one bm every 2 days with the Amitiza 24 mcg bid and Colace every 2 days    C-scope 2021: UTD - 10yr Recall, with much better bowel prep.  EGD 2021: UTD..    Patient Plan has heartburns despite taking the Omeprazole 20 mg daily in the morning all the time. mild dysphagia part from Cerebral Palsy. Frequent belching and acid regurgitation. had cholecystectomy in 1993. had left hip surgery in 1976 has no socket  last  EGD for for chronic GERD that she had for 35 years on 1/11/2021 received Fentanyl 200 mcg and Versed 11 mg IV  The Z-Line is irregular located at 32 cm. There are 2 short segments of Delvalle's like metaplasia in the distal esophagus one is > 1.1 cm and the second 10 mm. We took 3 biopsies. 2 cm hiatal hernia. The diaphragmatic hiatus is located at 35 cm. Numerous Fundic and hyperplastic polyps in the gastric body and on the larger curvature varying in sizes between 5 to 13 mm. I removed with hot snare at least 30 polyps because of their number and large size but only retrieved some with the Tenorio net for histology analysis. High tolerance to sedation.    Gastric polyp, biopsy:      Fragments of fundic gland polyp. Negative for intestinal metaplasia, dysplasia, and malignancy.  B:  Distal esophagus, biopsy:   Squamocolumnar mucosa with chronic  inflammation. Negative for intestinal metaplasia, dysplasia, and malignancy.  We need to repeat EGD. Add for now Famotidine 20 mg before bed time as most of her GERD is worse at night              This 64 year old  patient was referred by Al Nielsen.  This 64 year old patient presents for GERD.    History

## 2025-06-16 NOTE — PERIOP NOTE
Dr. Rafael Mon bedside, provider filled out facility paperwork as requested, facility paperwork, progress noted from provider and discharge instructions reviewed with patient and her father and sent with patient

## (undated) DEVICE — MOUTHPIECE ENDOSCP L CTRL OPN AND SIDE PORTS DISP

## (undated) DEVICE — CATH SUC CTRL PRT TRIFLO 14FR --

## (undated) DEVICE — CYSTO PACK: Brand: MEDLINE INDUSTRIES, INC.

## (undated) DEVICE — Device

## (undated) DEVICE — TRNQT TEXT 1X18IN BLU LF DISP -- CONVERT TO ITEM 362165

## (undated) DEVICE — SPONGE GZ W4XL4IN COT 12 PLY TYP VII WVN C FLD DSGN

## (undated) DEVICE — SOLUTION IV 500ML 0.9% SOD CHL FLX CONT

## (undated) DEVICE — TRAP SPEC COLL POLYP POLYSTYR --

## (undated) DEVICE — MOUTHPIECE ENDOSCP 20X27MM --

## (undated) DEVICE — INFLATION DEVICE: Brand: ENCORE 26

## (undated) DEVICE — GUIDEWIRE UROLOGICAL STR 3 CM 0.038 INX150 CM DUAL-FLEX

## (undated) DEVICE — CATHETER IV 22GA L1IN BLU POLYUR STR HUB RADPQ PROTCT +

## (undated) DEVICE — SYRINGE MED 50ML LUERSLIP TIP

## (undated) DEVICE — SYRINGE 50ML E/T

## (undated) DEVICE — CATHETER URETH 22FR BLLN 5CC SIL HYDRGEL 2 W F SPEC CARS M

## (undated) DEVICE — SET ADMIN 16ML TBNG L100IN 2 Y INJ SITE IV PIGGY BK DISP

## (undated) DEVICE — SYRINGE MEDICAL 3ML CLEAR PLASTIC STANDARD NON CONTROL LUERLOCK TIP DISPOSABLE

## (undated) DEVICE — WRISTBAND ID AD W2.5XL9.5CM RED VYN ADH CLSR UNI-PRINT

## (undated) DEVICE — BAG URIN LEG DISPOZ-A-BG 19OZ -- W/18IN EXT TUBING

## (undated) DEVICE — KENDALL RADIOLUCENT FOAM MONITORING ELECTRODE RECTANGULAR SHAPE: Brand: KENDALL

## (undated) DEVICE — TUBING, SUCTION, 1/4" X 12', STRAIGHT: Brand: MEDLINE

## (undated) DEVICE — GARMENT,MEDLINE,DVT,INT,CALF,MED, GEN2: Brand: MEDLINE

## (undated) DEVICE — NDL PRT INJ NSAF BLNT 18GX1.5 --

## (undated) DEVICE — CANNULA CUSH AD W/ 14FT TBG

## (undated) DEVICE — ERBE NESSY®PLATE 170 SPLIT; 168CM²; CABLE 3M: Brand: ERBE

## (undated) DEVICE — THE DISPOSABLE ROTH NET FOREIGN BODY STANDARD RETRIEVAL DEVICE IS USED IN THE ENDOSCOPIC RETRIEVAL OF FOREIGN BODY, FOOD BOLUS AND EXCISED TISSUE SUCH AS POLYPS.: Brand: ROTH NET

## (undated) DEVICE — SINGLE PORT MANIFOLD: Brand: NEPTUNE 2

## (undated) DEVICE — SYR 3ML LL TIP 1/10ML GRAD --

## (undated) DEVICE — SNARE POLYP MIC OVL 13X2.4MM -- CAPTIVATOR BX/10

## (undated) DEVICE — SYR 5ML 1/5 GRAD LL NSAF LF --

## (undated) DEVICE — SPONGE GZ W4XL4IN RAYON POLY 4 PLY NONWOVEN FASTER WICKING

## (undated) DEVICE — CATHETER NEPHSTMY 7FR L55CM HI PRSS BLLN 30FR L12CM SHTH

## (undated) DEVICE — BLUNTFILL: Brand: MONOJECT

## (undated) DEVICE — MAJ-1414 SINGLE USE ADPATER BIOPSY VALV: Brand: SINGLE USE ADAPTOR BIOPSY VALVE

## (undated) DEVICE — REM POLYHESIVE ADULT PATIENT RETURN ELECTRODE: Brand: VALLEYLAB

## (undated) DEVICE — CATH IV SAFE STR 22GX1IN BLU -- PROTECTIV PLUS

## (undated) DEVICE — TOURNIQUET PHLEB W1XL18IN BLU FLAT RL AND BND REUSE FOR IV

## (undated) DEVICE — EJECTOR SALIVA 6 IN FLX CLR

## (undated) DEVICE — NDL FLTR TIP 5 MIC 18GX1.5IN --

## (undated) DEVICE — ENDO CARRY-ON PROCEDURE KIT INCLUDES ENZYMATIC SPONGE, GAUZE, BIOHAZARD LABEL, TRAY, LUBRICANT, DIRTY SCOPE LABEL, WATER LABEL, TRAY, DRAWSTRING PAD, AND DEFENDO 4-PIECE KIT.: Brand: ENDO CARRY-ON PROCEDURE KIT

## (undated) DEVICE — GDWIRE 3CM FLX-TIP 0.038X150CM -- BX/5 SENSOR

## (undated) DEVICE — HIGH PRESSURE NEPHROSTOMY BALLOON CATHETER: Brand: NEPHROMAX